# Patient Record
Sex: FEMALE | Race: WHITE | NOT HISPANIC OR LATINO | URBAN - METROPOLITAN AREA
[De-identification: names, ages, dates, MRNs, and addresses within clinical notes are randomized per-mention and may not be internally consistent; named-entity substitution may affect disease eponyms.]

---

## 2018-12-06 ENCOUNTER — INPATIENT (INPATIENT)
Facility: HOSPITAL | Age: 33
LOS: 42 days | Discharge: HOME | DRG: 831 | End: 2019-01-18
Attending: OBSTETRICS & GYNECOLOGY | Admitting: OBSTETRICS & GYNECOLOGY
Payer: COMMERCIAL

## 2018-12-06 VITALS — WEIGHT: 163.14 LBS | HEIGHT: 66 IN

## 2018-12-06 DIAGNOSIS — Z3A.00 WEEKS OF GESTATION OF PREGNANCY NOT SPECIFIED: ICD-10-CM

## 2018-12-06 DIAGNOSIS — O26.899 OTHER SPECIFIED PREGNANCY RELATED CONDITIONS, UNSPECIFIED TRIMESTER: ICD-10-CM

## 2018-12-06 LAB
ALBUMIN SERPL ELPH-MCNC: 3.5 G/DL — SIGNIFICANT CHANGE UP (ref 3.3–5)
ALP SERPL-CCNC: 67 U/L — SIGNIFICANT CHANGE UP (ref 40–120)
ALT FLD-CCNC: 11 U/L — SIGNIFICANT CHANGE UP (ref 10–45)
ANION GAP SERPL CALC-SCNC: 18 MMOL/L — HIGH (ref 5–17)
APPEARANCE UR: CLEAR — SIGNIFICANT CHANGE UP
APTT BLD: 26.1 SEC — LOW (ref 27.5–36.3)
AST SERPL-CCNC: 14 U/L — SIGNIFICANT CHANGE UP (ref 10–40)
BASOPHILS NFR BLD AUTO: 0.3 % — SIGNIFICANT CHANGE UP (ref 0–2)
BILIRUB SERPL-MCNC: 0.2 MG/DL — SIGNIFICANT CHANGE UP (ref 0.2–1.2)
BILIRUB UR-MCNC: NEGATIVE — SIGNIFICANT CHANGE UP
BLD GP AB SCN SERPL QL: NEGATIVE — SIGNIFICANT CHANGE UP
BUN SERPL-MCNC: 6 MG/DL — LOW (ref 7–23)
CALCIUM SERPL-MCNC: 8.9 MG/DL — SIGNIFICANT CHANGE UP (ref 8.4–10.5)
CHLORIDE SERPL-SCNC: 103 MMOL/L — SIGNIFICANT CHANGE UP (ref 96–108)
CO2 SERPL-SCNC: 19 MMOL/L — LOW (ref 22–31)
COLOR SPEC: YELLOW — SIGNIFICANT CHANGE UP
CREAT SERPL-MCNC: 0.54 MG/DL — SIGNIFICANT CHANGE UP (ref 0.5–1.3)
DIFF PNL FLD: NEGATIVE — SIGNIFICANT CHANGE UP
EOSINOPHIL NFR BLD AUTO: 1.2 % — SIGNIFICANT CHANGE UP (ref 0–6)
FIBRINOGEN PPP-MCNC: 410 MG/DL — SIGNIFICANT CHANGE UP (ref 258–438)
GLUCOSE SERPL-MCNC: 105 MG/DL — HIGH (ref 70–99)
GLUCOSE UR QL: NEGATIVE — SIGNIFICANT CHANGE UP
HCT VFR BLD CALC: 35.6 % — SIGNIFICANT CHANGE UP (ref 34.5–45)
HGB BLD-MCNC: 12.5 G/DL — SIGNIFICANT CHANGE UP (ref 11.5–15.5)
INR BLD: 0.97 — SIGNIFICANT CHANGE UP (ref 0.88–1.16)
KETONES UR-MCNC: 15 MG/DL
LDH SERPL L TO P-CCNC: 132 U/L — SIGNIFICANT CHANGE UP (ref 50–242)
LEUKOCYTE ESTERASE UR-ACNC: NEGATIVE — SIGNIFICANT CHANGE UP
LYMPHOCYTES # BLD AUTO: 14 % — SIGNIFICANT CHANGE UP (ref 13–44)
MCHC RBC-ENTMCNC: 30.8 PG — SIGNIFICANT CHANGE UP (ref 27–34)
MCHC RBC-ENTMCNC: 35.1 G/DL — SIGNIFICANT CHANGE UP (ref 32–36)
MCV RBC AUTO: 87.7 FL — SIGNIFICANT CHANGE UP (ref 80–100)
MONOCYTES NFR BLD AUTO: 7.2 % — SIGNIFICANT CHANGE UP (ref 2–14)
NEUTROPHILS NFR BLD AUTO: 77.3 % — HIGH (ref 43–77)
NITRITE UR-MCNC: NEGATIVE — SIGNIFICANT CHANGE UP
PH UR: 6.5 — SIGNIFICANT CHANGE UP (ref 5–8)
PLATELET # BLD AUTO: 260 K/UL — SIGNIFICANT CHANGE UP (ref 150–400)
POTASSIUM SERPL-MCNC: 3.7 MMOL/L — SIGNIFICANT CHANGE UP (ref 3.5–5.3)
POTASSIUM SERPL-SCNC: 3.7 MMOL/L — SIGNIFICANT CHANGE UP (ref 3.5–5.3)
PROT SERPL-MCNC: 6.4 G/DL — SIGNIFICANT CHANGE UP (ref 6–8.3)
PROT UR-MCNC: NEGATIVE MG/DL — SIGNIFICANT CHANGE UP
PROTHROM AB SERPL-ACNC: 10.9 SEC — SIGNIFICANT CHANGE UP (ref 10–12.9)
RBC # BLD: 4.06 M/UL — SIGNIFICANT CHANGE UP (ref 3.8–5.2)
RBC # FLD: 13.6 % — SIGNIFICANT CHANGE UP (ref 10.3–16.9)
RH IG SCN BLD-IMP: POSITIVE — SIGNIFICANT CHANGE UP
SODIUM SERPL-SCNC: 140 MMOL/L — SIGNIFICANT CHANGE UP (ref 135–145)
SP GR SPEC: <=1.005 — SIGNIFICANT CHANGE UP (ref 1–1.03)
URATE SERPL-MCNC: 3 MG/DL — SIGNIFICANT CHANGE UP (ref 2.5–7)
UROBILINOGEN FLD QL: 0.2 E.U./DL — SIGNIFICANT CHANGE UP
WBC # BLD: 10.6 K/UL — HIGH (ref 3.8–10.5)
WBC # FLD AUTO: 10.6 K/UL — HIGH (ref 3.8–10.5)

## 2018-12-06 RX ORDER — NIFEDIPINE 30 MG
20 TABLET, EXTENDED RELEASE 24 HR ORAL ONCE
Qty: 0 | Refills: 0 | Status: COMPLETED | OUTPATIENT
Start: 2018-12-06 | End: 2018-12-06

## 2018-12-06 RX ORDER — SODIUM CHLORIDE 9 MG/ML
1000 INJECTION, SOLUTION INTRAVENOUS
Qty: 0 | Refills: 0 | Status: DISCONTINUED | OUTPATIENT
Start: 2018-12-06 | End: 2018-12-16

## 2018-12-06 RX ORDER — SODIUM CHLORIDE 9 MG/ML
1000 INJECTION, SOLUTION INTRAVENOUS
Qty: 0 | Refills: 0 | Status: DISCONTINUED | OUTPATIENT
Start: 2018-12-06 | End: 2018-12-06

## 2018-12-06 RX ORDER — NIFEDIPINE 30 MG
10 TABLET, EXTENDED RELEASE 24 HR ORAL EVERY 6 HOURS
Qty: 0 | Refills: 0 | Status: DISCONTINUED | OUTPATIENT
Start: 2018-12-06 | End: 2018-12-07

## 2018-12-06 RX ORDER — PROGESTERONE 200 MG/1
200 CAPSULE, LIQUID FILLED ORAL AT BEDTIME
Qty: 0 | Refills: 0 | Status: DISCONTINUED | OUTPATIENT
Start: 2018-12-06 | End: 2019-01-18

## 2018-12-06 RX ADMIN — Medication 20 MILLIGRAM(S): at 17:54

## 2018-12-06 RX ADMIN — PROGESTERONE 200 MILLIGRAM(S): 200 CAPSULE, LIQUID FILLED ORAL at 22:19

## 2018-12-06 RX ADMIN — Medication 12 MILLIGRAM(S): at 17:00

## 2018-12-07 LAB — T PALLIDUM AB TITR SER: NEGATIVE — SIGNIFICANT CHANGE UP

## 2018-12-07 PROCEDURE — 99251: CPT

## 2018-12-07 RX ORDER — SODIUM CHLORIDE 9 MG/ML
3 INJECTION INTRAMUSCULAR; INTRAVENOUS; SUBCUTANEOUS EVERY 8 HOURS
Qty: 0 | Refills: 0 | Status: DISCONTINUED | OUTPATIENT
Start: 2018-12-07 | End: 2018-12-16

## 2018-12-07 RX ORDER — ENOXAPARIN SODIUM 100 MG/ML
40 INJECTION SUBCUTANEOUS EVERY 24 HOURS
Qty: 0 | Refills: 0 | Status: DISCONTINUED | OUTPATIENT
Start: 2018-12-07 | End: 2018-12-15

## 2018-12-07 RX ORDER — NIFEDIPINE 30 MG
10 TABLET, EXTENDED RELEASE 24 HR ORAL EVERY 6 HOURS
Qty: 0 | Refills: 0 | Status: COMPLETED | OUTPATIENT
Start: 2018-12-07 | End: 2018-12-10

## 2018-12-07 RX ORDER — HEPARIN SODIUM 5000 [USP'U]/ML
5000 INJECTION INTRAVENOUS; SUBCUTANEOUS EVERY 12 HOURS
Qty: 0 | Refills: 0 | Status: DISCONTINUED | OUTPATIENT
Start: 2018-12-07 | End: 2018-12-07

## 2018-12-07 RX ADMIN — Medication 10 MILLIGRAM(S): at 17:01

## 2018-12-07 RX ADMIN — ENOXAPARIN SODIUM 40 MILLIGRAM(S): 100 INJECTION SUBCUTANEOUS at 16:59

## 2018-12-07 RX ADMIN — SODIUM CHLORIDE 3 MILLILITER(S): 9 INJECTION INTRAMUSCULAR; INTRAVENOUS; SUBCUTANEOUS at 22:49

## 2018-12-07 RX ADMIN — SODIUM CHLORIDE 3 MILLILITER(S): 9 INJECTION INTRAMUSCULAR; INTRAVENOUS; SUBCUTANEOUS at 12:33

## 2018-12-07 RX ADMIN — Medication 10 MILLIGRAM(S): at 23:04

## 2018-12-07 RX ADMIN — Medication 10 MILLIGRAM(S): at 12:08

## 2018-12-07 RX ADMIN — Medication 10 MILLIGRAM(S): at 05:29

## 2018-12-07 RX ADMIN — PROGESTERONE 200 MILLIGRAM(S): 200 CAPSULE, LIQUID FILLED ORAL at 23:05

## 2018-12-07 RX ADMIN — Medication 12 MILLIGRAM(S): at 16:55

## 2018-12-08 LAB
C TRACH RRNA SPEC QL NAA+PROBE: SIGNIFICANT CHANGE UP
N GONORRHOEA RRNA SPEC QL NAA+PROBE: SIGNIFICANT CHANGE UP
SPECIMEN SOURCE: SIGNIFICANT CHANGE UP

## 2018-12-08 RX ADMIN — PROGESTERONE 200 MILLIGRAM(S): 200 CAPSULE, LIQUID FILLED ORAL at 21:26

## 2018-12-08 RX ADMIN — SODIUM CHLORIDE 3 MILLILITER(S): 9 INJECTION INTRAMUSCULAR; INTRAVENOUS; SUBCUTANEOUS at 12:00

## 2018-12-08 RX ADMIN — SODIUM CHLORIDE 3 MILLILITER(S): 9 INJECTION INTRAMUSCULAR; INTRAVENOUS; SUBCUTANEOUS at 06:00

## 2018-12-08 RX ADMIN — SODIUM CHLORIDE 3 MILLILITER(S): 9 INJECTION INTRAMUSCULAR; INTRAVENOUS; SUBCUTANEOUS at 21:26

## 2018-12-08 RX ADMIN — Medication 10 MILLIGRAM(S): at 12:08

## 2018-12-08 RX ADMIN — Medication 1 TABLET(S): at 18:08

## 2018-12-08 RX ADMIN — ENOXAPARIN SODIUM 40 MILLIGRAM(S): 100 INJECTION SUBCUTANEOUS at 16:03

## 2018-12-08 RX ADMIN — Medication 10 MILLIGRAM(S): at 05:53

## 2018-12-08 RX ADMIN — Medication 10 MILLIGRAM(S): at 18:07

## 2018-12-09 LAB
-  CLINDAMYCIN: SIGNIFICANT CHANGE UP
-  LEVOFLOXACIN: SIGNIFICANT CHANGE UP
-  PENICILLIN: SIGNIFICANT CHANGE UP
-  VANCOMYCIN: SIGNIFICANT CHANGE UP
CULTURE RESULTS: SIGNIFICANT CHANGE UP
METHOD TYPE: SIGNIFICANT CHANGE UP
ORGANISM # SPEC MICROSCOPIC CNT: SIGNIFICANT CHANGE UP
ORGANISM # SPEC MICROSCOPIC CNT: SIGNIFICANT CHANGE UP
SPECIMEN SOURCE: SIGNIFICANT CHANGE UP

## 2018-12-09 RX ORDER — PENICILLIN G POTASSIUM 5000000 [IU]/1
2.5 POWDER, FOR SOLUTION INTRAMUSCULAR; INTRAPLEURAL; INTRATHECAL; INTRAVENOUS EVERY 4 HOURS
Qty: 0 | Refills: 0 | Status: DISCONTINUED | OUTPATIENT
Start: 2018-12-10 | End: 2018-12-10

## 2018-12-09 RX ORDER — DOCUSATE SODIUM 100 MG
100 CAPSULE ORAL
Qty: 0 | Refills: 0 | Status: DISCONTINUED | OUTPATIENT
Start: 2018-12-09 | End: 2019-01-18

## 2018-12-09 RX ORDER — PENICILLIN G POTASSIUM 5000000 [IU]/1
POWDER, FOR SOLUTION INTRAMUSCULAR; INTRAPLEURAL; INTRATHECAL; INTRAVENOUS
Qty: 0 | Refills: 0 | Status: DISCONTINUED | OUTPATIENT
Start: 2018-12-09 | End: 2018-12-10

## 2018-12-09 RX ORDER — PENICILLIN G POTASSIUM 5000000 [IU]/1
5 POWDER, FOR SOLUTION INTRAMUSCULAR; INTRAPLEURAL; INTRATHECAL; INTRAVENOUS ONCE
Qty: 0 | Refills: 0 | Status: COMPLETED | OUTPATIENT
Start: 2018-12-09 | End: 2018-12-09

## 2018-12-09 RX ADMIN — Medication 1 TABLET(S): at 12:21

## 2018-12-09 RX ADMIN — Medication 10 MILLIGRAM(S): at 18:00

## 2018-12-09 RX ADMIN — PENICILLIN G POTASSIUM 100 MILLION UNIT(S): 5000000 POWDER, FOR SOLUTION INTRAMUSCULAR; INTRAPLEURAL; INTRATHECAL; INTRAVENOUS at 21:14

## 2018-12-09 RX ADMIN — PROGESTERONE 200 MILLIGRAM(S): 200 CAPSULE, LIQUID FILLED ORAL at 21:42

## 2018-12-09 RX ADMIN — Medication 10 MILLIGRAM(S): at 12:21

## 2018-12-09 RX ADMIN — Medication 10 MILLIGRAM(S): at 06:19

## 2018-12-09 RX ADMIN — SODIUM CHLORIDE 3 MILLILITER(S): 9 INJECTION INTRAMUSCULAR; INTRAVENOUS; SUBCUTANEOUS at 06:19

## 2018-12-09 RX ADMIN — SODIUM CHLORIDE 3 MILLILITER(S): 9 INJECTION INTRAMUSCULAR; INTRAVENOUS; SUBCUTANEOUS at 17:07

## 2018-12-09 RX ADMIN — Medication 100 MILLIGRAM(S): at 18:00

## 2018-12-09 RX ADMIN — ENOXAPARIN SODIUM 40 MILLIGRAM(S): 100 INJECTION SUBCUTANEOUS at 17:08

## 2018-12-09 RX ADMIN — SODIUM CHLORIDE 3 MILLILITER(S): 9 INJECTION INTRAMUSCULAR; INTRAVENOUS; SUBCUTANEOUS at 21:42

## 2018-12-10 LAB
T4 AB SER-ACNC: 8.79 UG/DL — SIGNIFICANT CHANGE UP (ref 3.17–11.72)
TSH SERPL-MCNC: 0.61 UIU/ML — SIGNIFICANT CHANGE UP (ref 0.35–4.94)

## 2018-12-10 RX ORDER — PENICILLIN G POTASSIUM 5000000 [IU]/1
2.5 POWDER, FOR SOLUTION INTRAMUSCULAR; INTRAPLEURAL; INTRATHECAL; INTRAVENOUS EVERY 6 HOURS
Qty: 0 | Refills: 0 | Status: DISCONTINUED | OUTPATIENT
Start: 2018-12-10 | End: 2018-12-13

## 2018-12-10 RX ORDER — PENICILLIN G POTASSIUM 5000000 [IU]/1
2.5 POWDER, FOR SOLUTION INTRAMUSCULAR; INTRAPLEURAL; INTRATHECAL; INTRAVENOUS EVERY 6 HOURS
Qty: 0 | Refills: 0 | Status: DISCONTINUED | OUTPATIENT
Start: 2018-12-10 | End: 2018-12-10

## 2018-12-10 RX ADMIN — SODIUM CHLORIDE 3 MILLILITER(S): 9 INJECTION INTRAMUSCULAR; INTRAVENOUS; SUBCUTANEOUS at 06:27

## 2018-12-10 RX ADMIN — Medication 10 MILLIGRAM(S): at 06:27

## 2018-12-10 RX ADMIN — Medication 100 MILLIGRAM(S): at 06:27

## 2018-12-10 RX ADMIN — PENICILLIN G POTASSIUM 100 MILLION UNIT(S): 5000000 POWDER, FOR SOLUTION INTRAMUSCULAR; INTRAPLEURAL; INTRATHECAL; INTRAVENOUS at 18:00

## 2018-12-10 RX ADMIN — SODIUM CHLORIDE 3 MILLILITER(S): 9 INJECTION INTRAMUSCULAR; INTRAVENOUS; SUBCUTANEOUS at 12:00

## 2018-12-10 RX ADMIN — Medication 10 MILLIGRAM(S): at 01:50

## 2018-12-10 RX ADMIN — PENICILLIN G POTASSIUM 100 MILLION UNIT(S): 5000000 POWDER, FOR SOLUTION INTRAMUSCULAR; INTRAPLEURAL; INTRATHECAL; INTRAVENOUS at 06:27

## 2018-12-10 RX ADMIN — Medication 100 MILLIGRAM(S): at 17:50

## 2018-12-10 RX ADMIN — Medication 1 TABLET(S): at 12:08

## 2018-12-10 RX ADMIN — ENOXAPARIN SODIUM 40 MILLIGRAM(S): 100 INJECTION SUBCUTANEOUS at 16:10

## 2018-12-10 RX ADMIN — PENICILLIN G POTASSIUM 100 MILLION UNIT(S): 5000000 POWDER, FOR SOLUTION INTRAMUSCULAR; INTRAPLEURAL; INTRATHECAL; INTRAVENOUS at 01:35

## 2018-12-10 RX ADMIN — Medication 10 MILLIGRAM(S): at 12:08

## 2018-12-10 RX ADMIN — PENICILLIN G POTASSIUM 100 MILLION UNIT(S): 5000000 POWDER, FOR SOLUTION INTRAMUSCULAR; INTRAPLEURAL; INTRATHECAL; INTRAVENOUS at 10:00

## 2018-12-11 RX ADMIN — PENICILLIN G POTASSIUM 100 MILLION UNIT(S): 5000000 POWDER, FOR SOLUTION INTRAMUSCULAR; INTRAPLEURAL; INTRATHECAL; INTRAVENOUS at 18:00

## 2018-12-11 RX ADMIN — PENICILLIN G POTASSIUM 100 MILLION UNIT(S): 5000000 POWDER, FOR SOLUTION INTRAMUSCULAR; INTRAPLEURAL; INTRATHECAL; INTRAVENOUS at 12:00

## 2018-12-11 RX ADMIN — SODIUM CHLORIDE 3 MILLILITER(S): 9 INJECTION INTRAMUSCULAR; INTRAVENOUS; SUBCUTANEOUS at 00:31

## 2018-12-11 RX ADMIN — SODIUM CHLORIDE 3 MILLILITER(S): 9 INJECTION INTRAMUSCULAR; INTRAVENOUS; SUBCUTANEOUS at 22:15

## 2018-12-11 RX ADMIN — SODIUM CHLORIDE 3 MILLILITER(S): 9 INJECTION INTRAMUSCULAR; INTRAVENOUS; SUBCUTANEOUS at 09:00

## 2018-12-11 RX ADMIN — PROGESTERONE 200 MILLIGRAM(S): 200 CAPSULE, LIQUID FILLED ORAL at 22:16

## 2018-12-11 RX ADMIN — SODIUM CHLORIDE 3 MILLILITER(S): 9 INJECTION INTRAMUSCULAR; INTRAVENOUS; SUBCUTANEOUS at 06:15

## 2018-12-11 RX ADMIN — PENICILLIN G POTASSIUM 100 MILLION UNIT(S): 5000000 POWDER, FOR SOLUTION INTRAMUSCULAR; INTRAPLEURAL; INTRATHECAL; INTRAVENOUS at 00:31

## 2018-12-11 RX ADMIN — PENICILLIN G POTASSIUM 100 MILLION UNIT(S): 5000000 POWDER, FOR SOLUTION INTRAMUSCULAR; INTRAPLEURAL; INTRATHECAL; INTRAVENOUS at 06:15

## 2018-12-11 RX ADMIN — Medication 1 TABLET(S): at 11:39

## 2018-12-11 RX ADMIN — Medication 100 MILLIGRAM(S): at 19:35

## 2018-12-11 RX ADMIN — ENOXAPARIN SODIUM 40 MILLIGRAM(S): 100 INJECTION SUBCUTANEOUS at 16:00

## 2018-12-11 NOTE — DIETITIAN INITIAL EVALUATION ADULT. - PHYSICAL APPEARANCE
pre-pregnancy weight:150lbs 115% of IBW.18lb weight gain in 24weeks and 5days gestation.Goal weight gain:34-45lbs during complete gestation./well nourished

## 2018-12-11 NOTE — DIETITIAN INITIAL EVALUATION ADULT. - OTHER INFO
34 y/o female 24weeks and 5days gestation with twins admitted with decreased CL of 6mm with funneling.Patient observed eating 80% of regular diet without any GI complaints.No pain and skin intact.Patient generally eats healthy diet with Prenatal vitamins.Pre-pregnancy weight 115% of IBW.

## 2018-12-11 NOTE — DIETITIAN INITIAL EVALUATION ADULT. - ENERGY NEEDS
Ht:5ft 6inches,IBW:130lbs +/-10%,115% of IBW.18lb weight gain since pregnancy.Increased kcal needs of 300kcal per twin(600+kcal) and 50gm additional protein per pre-pregnancy protein demands:105gmprotein and 35-40cc fluids:2380-2720cc fluids.

## 2018-12-12 LAB
BLD GP AB SCN SERPL QL: NEGATIVE — SIGNIFICANT CHANGE UP
RH IG SCN BLD-IMP: POSITIVE — SIGNIFICANT CHANGE UP

## 2018-12-12 RX ADMIN — SODIUM CHLORIDE 3 MILLILITER(S): 9 INJECTION INTRAMUSCULAR; INTRAVENOUS; SUBCUTANEOUS at 13:22

## 2018-12-12 RX ADMIN — Medication 1 TABLET(S): at 12:04

## 2018-12-12 RX ADMIN — PENICILLIN G POTASSIUM 100 MILLION UNIT(S): 5000000 POWDER, FOR SOLUTION INTRAMUSCULAR; INTRAPLEURAL; INTRATHECAL; INTRAVENOUS at 00:05

## 2018-12-12 RX ADMIN — SODIUM CHLORIDE 3 MILLILITER(S): 9 INJECTION INTRAMUSCULAR; INTRAVENOUS; SUBCUTANEOUS at 22:32

## 2018-12-12 RX ADMIN — PENICILLIN G POTASSIUM 100 MILLION UNIT(S): 5000000 POWDER, FOR SOLUTION INTRAMUSCULAR; INTRAPLEURAL; INTRATHECAL; INTRAVENOUS at 18:10

## 2018-12-12 RX ADMIN — ENOXAPARIN SODIUM 40 MILLIGRAM(S): 100 INJECTION SUBCUTANEOUS at 16:24

## 2018-12-12 RX ADMIN — PENICILLIN G POTASSIUM 100 MILLION UNIT(S): 5000000 POWDER, FOR SOLUTION INTRAMUSCULAR; INTRAPLEURAL; INTRATHECAL; INTRAVENOUS at 06:20

## 2018-12-12 RX ADMIN — PENICILLIN G POTASSIUM 100 MILLION UNIT(S): 5000000 POWDER, FOR SOLUTION INTRAMUSCULAR; INTRAPLEURAL; INTRATHECAL; INTRAVENOUS at 12:06

## 2018-12-12 RX ADMIN — SODIUM CHLORIDE 3 MILLILITER(S): 9 INJECTION INTRAMUSCULAR; INTRAVENOUS; SUBCUTANEOUS at 06:20

## 2018-12-12 RX ADMIN — Medication 100 MILLIGRAM(S): at 18:09

## 2018-12-12 RX ADMIN — Medication 100 MILLIGRAM(S): at 08:44

## 2018-12-12 RX ADMIN — PROGESTERONE 200 MILLIGRAM(S): 200 CAPSULE, LIQUID FILLED ORAL at 22:32

## 2018-12-13 RX ADMIN — PENICILLIN G POTASSIUM 100 MILLION UNIT(S): 5000000 POWDER, FOR SOLUTION INTRAMUSCULAR; INTRAPLEURAL; INTRATHECAL; INTRAVENOUS at 18:00

## 2018-12-13 RX ADMIN — PROGESTERONE 200 MILLIGRAM(S): 200 CAPSULE, LIQUID FILLED ORAL at 21:45

## 2018-12-13 RX ADMIN — PENICILLIN G POTASSIUM 100 MILLION UNIT(S): 5000000 POWDER, FOR SOLUTION INTRAMUSCULAR; INTRAPLEURAL; INTRATHECAL; INTRAVENOUS at 06:29

## 2018-12-13 RX ADMIN — PENICILLIN G POTASSIUM 100 MILLION UNIT(S): 5000000 POWDER, FOR SOLUTION INTRAMUSCULAR; INTRAPLEURAL; INTRATHECAL; INTRAVENOUS at 00:02

## 2018-12-13 RX ADMIN — SODIUM CHLORIDE 3 MILLILITER(S): 9 INJECTION INTRAMUSCULAR; INTRAVENOUS; SUBCUTANEOUS at 06:30

## 2018-12-13 RX ADMIN — SODIUM CHLORIDE 3 MILLILITER(S): 9 INJECTION INTRAMUSCULAR; INTRAVENOUS; SUBCUTANEOUS at 21:45

## 2018-12-13 RX ADMIN — SODIUM CHLORIDE 3 MILLILITER(S): 9 INJECTION INTRAMUSCULAR; INTRAVENOUS; SUBCUTANEOUS at 12:00

## 2018-12-13 RX ADMIN — ENOXAPARIN SODIUM 40 MILLIGRAM(S): 100 INJECTION SUBCUTANEOUS at 16:00

## 2018-12-13 RX ADMIN — Medication 1 TABLET(S): at 12:08

## 2018-12-13 RX ADMIN — PENICILLIN G POTASSIUM 100 MILLION UNIT(S): 5000000 POWDER, FOR SOLUTION INTRAMUSCULAR; INTRAPLEURAL; INTRATHECAL; INTRAVENOUS at 12:00

## 2018-12-14 LAB
A VAGINAE DNA VAG QL NAA+PROBE: SIGNIFICANT CHANGE UP
BVAB2 DNA VAG QL NAA+PROBE: SIGNIFICANT CHANGE UP
C ALBICANS DNA VAG QL NAA+PROBE: NEGATIVE — SIGNIFICANT CHANGE UP
C GLABRATA DNA VAG QL NAA+PROBE: NEGATIVE — SIGNIFICANT CHANGE UP
M GENITALIUM DNA SPEC QL NAA+PROBE: NEGATIVE — SIGNIFICANT CHANGE UP
M HOMINIS DNA SPEC QL NAA+PROBE: NEGATIVE — SIGNIFICANT CHANGE UP
MEGA1 DNA VAG QL NAA+PROBE: SIGNIFICANT CHANGE UP
T VAGINALIS RRNA SPEC QL NAA+PROBE: NEGATIVE — SIGNIFICANT CHANGE UP
UREAPLASMA DNA SPEC QL NAA+PROBE: NEGATIVE — SIGNIFICANT CHANGE UP
VAGINITIS PANEL COMMENT: SIGNIFICANT CHANGE UP

## 2018-12-14 RX ADMIN — SODIUM CHLORIDE 3 MILLILITER(S): 9 INJECTION INTRAMUSCULAR; INTRAVENOUS; SUBCUTANEOUS at 06:47

## 2018-12-14 RX ADMIN — ENOXAPARIN SODIUM 40 MILLIGRAM(S): 100 INJECTION SUBCUTANEOUS at 15:59

## 2018-12-14 RX ADMIN — SODIUM CHLORIDE 3 MILLILITER(S): 9 INJECTION INTRAMUSCULAR; INTRAVENOUS; SUBCUTANEOUS at 22:01

## 2018-12-14 RX ADMIN — PROGESTERONE 200 MILLIGRAM(S): 200 CAPSULE, LIQUID FILLED ORAL at 22:00

## 2018-12-14 RX ADMIN — SODIUM CHLORIDE 3 MILLILITER(S): 9 INJECTION INTRAMUSCULAR; INTRAVENOUS; SUBCUTANEOUS at 13:23

## 2018-12-14 RX ADMIN — Medication 100 MILLIGRAM(S): at 18:05

## 2018-12-14 RX ADMIN — Medication 1 TABLET(S): at 12:06

## 2018-12-15 LAB
BLD GP AB SCN SERPL QL: NEGATIVE — SIGNIFICANT CHANGE UP
HCT VFR BLD CALC: 34.6 % — SIGNIFICANT CHANGE UP (ref 34.5–45)
HGB BLD-MCNC: 12 G/DL — SIGNIFICANT CHANGE UP (ref 11.5–15.5)
MCHC RBC-ENTMCNC: 31.1 PG — SIGNIFICANT CHANGE UP (ref 27–34)
MCHC RBC-ENTMCNC: 34.7 G/DL — SIGNIFICANT CHANGE UP (ref 32–36)
MCV RBC AUTO: 89.6 FL — SIGNIFICANT CHANGE UP (ref 80–100)
PLATELET # BLD AUTO: 237 K/UL — SIGNIFICANT CHANGE UP (ref 150–400)
RBC # BLD: 3.86 M/UL — SIGNIFICANT CHANGE UP (ref 3.8–5.2)
RBC # FLD: 13.9 % — SIGNIFICANT CHANGE UP (ref 10.3–16.9)
RH IG SCN BLD-IMP: POSITIVE — SIGNIFICANT CHANGE UP
WBC # BLD: 10.1 K/UL — SIGNIFICANT CHANGE UP (ref 3.8–10.5)
WBC # FLD AUTO: 10.1 K/UL — SIGNIFICANT CHANGE UP (ref 3.8–10.5)

## 2018-12-15 RX ORDER — BACITRACIN ZINC 500 UNIT/G
1 OINTMENT IN PACKET (EA) TOPICAL ONCE
Qty: 0 | Refills: 0 | Status: COMPLETED | OUTPATIENT
Start: 2018-12-15 | End: 2018-12-15

## 2018-12-15 RX ORDER — ENOXAPARIN SODIUM 100 MG/ML
40 INJECTION SUBCUTANEOUS DAILY
Qty: 0 | Refills: 0 | Status: DISCONTINUED | OUTPATIENT
Start: 2018-12-15 | End: 2019-01-18

## 2018-12-15 RX ADMIN — PROGESTERONE 200 MILLIGRAM(S): 200 CAPSULE, LIQUID FILLED ORAL at 23:53

## 2018-12-15 RX ADMIN — ENOXAPARIN SODIUM 40 MILLIGRAM(S): 100 INJECTION SUBCUTANEOUS at 16:11

## 2018-12-15 RX ADMIN — Medication 1 TABLET(S): at 11:56

## 2018-12-15 RX ADMIN — Medication 1 APPLICATION(S): at 16:15

## 2018-12-15 RX ADMIN — Medication 100 MILLIGRAM(S): at 06:19

## 2018-12-16 RX ADMIN — Medication 1 APPLICATION(S): at 18:10

## 2018-12-16 RX ADMIN — Medication 100 MILLIGRAM(S): at 18:10

## 2018-12-16 RX ADMIN — PROGESTERONE 200 MILLIGRAM(S): 200 CAPSULE, LIQUID FILLED ORAL at 21:57

## 2018-12-16 RX ADMIN — Medication 1 TABLET(S): at 21:55

## 2018-12-16 RX ADMIN — ENOXAPARIN SODIUM 40 MILLIGRAM(S): 100 INJECTION SUBCUTANEOUS at 16:17

## 2018-12-16 RX ADMIN — Medication 1 APPLICATION(S): at 06:27

## 2018-12-16 RX ADMIN — Medication 100 MILLIGRAM(S): at 06:29

## 2018-12-17 RX ADMIN — Medication 100 MILLIGRAM(S): at 18:08

## 2018-12-17 RX ADMIN — Medication 1 TABLET(S): at 12:12

## 2018-12-17 RX ADMIN — Medication 1 APPLICATION(S): at 18:00

## 2018-12-17 RX ADMIN — PROGESTERONE 200 MILLIGRAM(S): 200 CAPSULE, LIQUID FILLED ORAL at 21:22

## 2018-12-17 RX ADMIN — SODIUM CHLORIDE 3 MILLILITER(S): 9 INJECTION INTRAMUSCULAR; INTRAVENOUS; SUBCUTANEOUS at 21:21

## 2018-12-17 RX ADMIN — Medication 1 APPLICATION(S): at 06:39

## 2018-12-17 RX ADMIN — ENOXAPARIN SODIUM 40 MILLIGRAM(S): 100 INJECTION SUBCUTANEOUS at 16:00

## 2018-12-17 RX ADMIN — Medication 100 MILLIGRAM(S): at 06:38

## 2018-12-18 DIAGNOSIS — F43.22 ADJUSTMENT DISORDER WITH ANXIETY: ICD-10-CM

## 2018-12-18 PROCEDURE — 99223 1ST HOSP IP/OBS HIGH 75: CPT

## 2018-12-18 RX ADMIN — Medication 1 APPLICATION(S): at 18:25

## 2018-12-18 RX ADMIN — Medication 1 APPLICATION(S): at 07:07

## 2018-12-18 RX ADMIN — ENOXAPARIN SODIUM 40 MILLIGRAM(S): 100 INJECTION SUBCUTANEOUS at 16:01

## 2018-12-18 RX ADMIN — PROGESTERONE 200 MILLIGRAM(S): 200 CAPSULE, LIQUID FILLED ORAL at 22:55

## 2018-12-18 RX ADMIN — Medication 100 MILLIGRAM(S): at 07:07

## 2018-12-18 RX ADMIN — Medication 1 TABLET(S): at 12:37

## 2018-12-18 NOTE — BEHAVIORAL HEALTH ASSESSMENT NOTE - SUMMARY
Ms. Snyder is a 33 year old White female, , residing in apartment in [place] with her  and 2.5 year old daughter, pregnant with twins,  at 25 weeks, history of social anxiety/SOLITARIO, no past psychiatric hospitalizations, who Pt is presenting with tearful with anxious mood and congruent affect. Patient disclosed feeling sad and worried about the prognosis of her twins, having difficulty coping with being alone in the hospital, and feeling guilty about being away from her daughter. Patient is hesitant to engage in talk therapy but is open to gaining positive coping skills/relaxation training to help her stay optimistic and better manage distress. Writer provided supportive psychotherapy. There is no evidence of SI/danger to self.

## 2018-12-18 NOTE — BEHAVIORAL HEALTH ASSESSMENT NOTE - NSBHADMITCOUNSEL_PSY_A_CORE
risk factor reduction/prognosis/instructions for management, treatment and follow up/risks and benefits of treatment options/client/family/caregiver education/diagnostic results/impressions and/or recommended studies/importance of adherence to chosen treatment

## 2018-12-18 NOTE — CHART NOTE - NSCHARTNOTEFT_GEN_A_CORE
Admitting Diagnosis:   Patient is a 33y old  Female who presents with a chief complaint of     PAST MEDICAL & SURGICAL HISTORY:      Current Nutrition Order:regular       PO Intake: Good (%) [ x  ]  Fair (50-75%) [   ] Poor (<25%) [   ]    GI Issues: none    Pain:none    Skin Integrity:intact    Labs:         CAPILLARY BLOOD GLUCOSE          Medications:  MEDICATIONS  (STANDING):  clotrimazole 1% Cream 1 Application(s) Topical two times a day  docusate sodium 100 milliGRAM(s) Oral two times a day  enoxaparin Injectable 40 milliGRAM(s) SubCutaneous daily  prenatal multivitamin 1 Tablet(s) Oral daily  progesterone Vaginal Insert 200 milliGRAM(s) Vaginal at bedtime    MEDICATIONS  (PRN):      Weight:76.2kg  Daily     Daily no updated weights    Weight Change: no updated weights    Nutrition Focused Physical Exam: Completed [   ]  Not Pertinent [ x  ]  Muscle Wasting- Temporal [   ]  Clavicle/Pectoral [   ]  Shoulder/Deltoid [   ]  Scapula [   ]  Interosseous [   ]  Quadriceps [   ]  Gastrocnemius [   ]  Fat Wasting- Orbital [   ]  Buccal [   ]  Triceps [   ]  Rib [   ]  Suspect [PCM] 2/2 to physical assessment, [poor intake], and [wt loss]; please see malnutrition chart note.    Estimated energy needs: Based on pre-pregnancy weight:150lbs(between %) 68gmprotein [id number](plus 600kcal for twins demands)2300-2640kcal and 50gmprotein additional:118gmprotein and 35-40cc fluids:2380-2720cc fluids    Subjective: 32y/o female 26weeks gestation with twins admitted with short cervix.Eating adequate amounts without issues.    Previous Nutrition Diagnosis:increased nutrient needs r/t increased demand for kcal/protein AEB:2nd trimester and twin demands    Active [   x]  Resolved [   ]    If resolved, new PES:     Goal:meet 80% of needs consistently    Recommendations:1.Updated weights    Education: completed    Risk Level: High [   ] Moderate [  x ] Low [   ]

## 2018-12-18 NOTE — BEHAVIORAL HEALTH ASSESSMENT NOTE - NSBHSUICPROTECTFACT_PSY_A_CORE
Supportive social network or family/Fear of death or dying due to pain/suffering/Ability to cope with stress/Identifies reasons for living/Engaged in work or school/Positive therapeutic relationships/Future oriented/Responsibility to family and others/High frustration tolerance

## 2018-12-19 LAB
APPEARANCE UR: CLEAR — SIGNIFICANT CHANGE UP
BILIRUB UR-MCNC: NEGATIVE — SIGNIFICANT CHANGE UP
COLOR SPEC: YELLOW — SIGNIFICANT CHANGE UP
DIFF PNL FLD: NEGATIVE — SIGNIFICANT CHANGE UP
GLUCOSE UR QL: NEGATIVE — SIGNIFICANT CHANGE UP
KETONES UR-MCNC: NEGATIVE — SIGNIFICANT CHANGE UP
LEUKOCYTE ESTERASE UR-ACNC: NEGATIVE — SIGNIFICANT CHANGE UP
NITRITE UR-MCNC: NEGATIVE — SIGNIFICANT CHANGE UP
PH UR: 6.5 — SIGNIFICANT CHANGE UP (ref 5–8)
PROT UR-MCNC: NEGATIVE MG/DL — SIGNIFICANT CHANGE UP
SP GR SPEC: 1.01 — SIGNIFICANT CHANGE UP (ref 1–1.03)
UROBILINOGEN FLD QL: 0.2 E.U./DL — SIGNIFICANT CHANGE UP

## 2018-12-19 RX ADMIN — Medication 100 MILLIGRAM(S): at 07:49

## 2018-12-19 RX ADMIN — Medication 100 MILLIGRAM(S): at 18:25

## 2018-12-19 RX ADMIN — ENOXAPARIN SODIUM 40 MILLIGRAM(S): 100 INJECTION SUBCUTANEOUS at 17:35

## 2018-12-19 RX ADMIN — Medication 1 TABLET(S): at 13:19

## 2018-12-19 RX ADMIN — Medication 1 APPLICATION(S): at 07:10

## 2018-12-20 LAB
CULTURE RESULTS: SIGNIFICANT CHANGE UP
SPECIMEN SOURCE: SIGNIFICANT CHANGE UP

## 2018-12-20 RX ADMIN — PROGESTERONE 200 MILLIGRAM(S): 200 CAPSULE, LIQUID FILLED ORAL at 23:28

## 2018-12-20 RX ADMIN — Medication 1 TABLET(S): at 12:29

## 2018-12-20 RX ADMIN — Medication 1 APPLICATION(S): at 12:00

## 2018-12-20 RX ADMIN — ENOXAPARIN SODIUM 40 MILLIGRAM(S): 100 INJECTION SUBCUTANEOUS at 16:30

## 2018-12-21 RX ADMIN — Medication 100 MILLIGRAM(S): at 18:29

## 2018-12-21 RX ADMIN — ENOXAPARIN SODIUM 40 MILLIGRAM(S): 100 INJECTION SUBCUTANEOUS at 16:24

## 2018-12-21 RX ADMIN — Medication 1 TABLET(S): at 19:50

## 2018-12-22 RX ADMIN — Medication 1 TABLET(S): at 12:09

## 2018-12-22 RX ADMIN — Medication 100 MILLIGRAM(S): at 06:19

## 2018-12-22 RX ADMIN — ENOXAPARIN SODIUM 40 MILLIGRAM(S): 100 INJECTION SUBCUTANEOUS at 16:26

## 2018-12-22 RX ADMIN — Medication 1 APPLICATION(S): at 18:12

## 2018-12-22 RX ADMIN — Medication 100 MILLIGRAM(S): at 18:09

## 2018-12-22 RX ADMIN — PROGESTERONE 200 MILLIGRAM(S): 200 CAPSULE, LIQUID FILLED ORAL at 22:24

## 2018-12-23 RX ADMIN — Medication 1 APPLICATION(S): at 18:00

## 2018-12-23 RX ADMIN — Medication 1 APPLICATION(S): at 06:22

## 2018-12-23 RX ADMIN — Medication 100 MILLIGRAM(S): at 07:22

## 2018-12-23 RX ADMIN — Medication 100 MILLIGRAM(S): at 18:40

## 2018-12-23 RX ADMIN — Medication 1 TABLET(S): at 21:23

## 2018-12-23 RX ADMIN — ENOXAPARIN SODIUM 40 MILLIGRAM(S): 100 INJECTION SUBCUTANEOUS at 16:01

## 2018-12-24 LAB
BLD GP AB SCN SERPL QL: NEGATIVE — SIGNIFICANT CHANGE UP
GLUCOSE SERPL-MCNC: 136 MG/DL — HIGH (ref 70–99)
RH IG SCN BLD-IMP: POSITIVE — SIGNIFICANT CHANGE UP

## 2018-12-24 RX ORDER — DEXTROSE 50 % IN WATER 50 %
50 SYRINGE (ML) INTRAVENOUS ONCE
Qty: 0 | Refills: 0 | Status: COMPLETED | OUTPATIENT
Start: 2018-12-24 | End: 2018-12-24

## 2018-12-24 RX ADMIN — ENOXAPARIN SODIUM 40 MILLIGRAM(S): 100 INJECTION SUBCUTANEOUS at 16:22

## 2018-12-24 RX ADMIN — Medication 1 TABLET(S): at 21:57

## 2018-12-24 RX ADMIN — Medication 100 MILLIGRAM(S): at 07:46

## 2018-12-24 RX ADMIN — Medication 1 APPLICATION(S): at 18:48

## 2018-12-24 RX ADMIN — Medication 50 GRAM(S): at 10:45

## 2018-12-24 RX ADMIN — Medication 100 MILLIGRAM(S): at 18:49

## 2018-12-25 RX ORDER — DEXTROSE 50 % IN WATER 50 %
25 SYRINGE (ML) INTRAVENOUS ONCE
Qty: 0 | Refills: 0 | Status: DISCONTINUED | OUTPATIENT
Start: 2018-12-25 | End: 2019-01-06

## 2018-12-25 RX ORDER — GLUCAGON INJECTION, SOLUTION 0.5 MG/.1ML
1 INJECTION, SOLUTION SUBCUTANEOUS ONCE
Qty: 0 | Refills: 0 | Status: DISCONTINUED | OUTPATIENT
Start: 2018-12-25 | End: 2019-01-06

## 2018-12-25 RX ORDER — DEXTROSE 50 % IN WATER 50 %
15 SYRINGE (ML) INTRAVENOUS ONCE
Qty: 0 | Refills: 0 | Status: DISCONTINUED | OUTPATIENT
Start: 2018-12-25 | End: 2019-01-06

## 2018-12-25 RX ORDER — DEXTROSE 50 % IN WATER 50 %
12.5 SYRINGE (ML) INTRAVENOUS ONCE
Qty: 0 | Refills: 0 | Status: DISCONTINUED | OUTPATIENT
Start: 2018-12-25 | End: 2019-01-06

## 2018-12-25 RX ORDER — INSULIN LISPRO 100/ML
VIAL (ML) SUBCUTANEOUS
Qty: 0 | Refills: 0 | Status: DISCONTINUED | OUTPATIENT
Start: 2018-12-25 | End: 2019-01-18

## 2018-12-25 RX ORDER — SODIUM CHLORIDE 9 MG/ML
1000 INJECTION, SOLUTION INTRAVENOUS
Qty: 0 | Refills: 0 | Status: DISCONTINUED | OUTPATIENT
Start: 2018-12-25 | End: 2019-01-06

## 2018-12-25 RX ADMIN — Medication 1 TABLET(S): at 21:20

## 2018-12-25 RX ADMIN — PROGESTERONE 200 MILLIGRAM(S): 200 CAPSULE, LIQUID FILLED ORAL at 22:49

## 2018-12-25 RX ADMIN — Medication 100 MILLIGRAM(S): at 07:16

## 2018-12-25 RX ADMIN — ENOXAPARIN SODIUM 40 MILLIGRAM(S): 100 INJECTION SUBCUTANEOUS at 16:11

## 2018-12-25 RX ADMIN — Medication 1 APPLICATION(S): at 18:59

## 2018-12-25 RX ADMIN — Medication 1 APPLICATION(S): at 07:16

## 2018-12-25 RX ADMIN — Medication 100 MILLIGRAM(S): at 18:59

## 2018-12-25 RX ADMIN — Medication 100 MILLIGRAM(S): at 19:12

## 2018-12-26 LAB
GLUCOSE BLDC GLUCOMTR-MCNC: 87 MG/DL — SIGNIFICANT CHANGE UP (ref 70–99)
GLUCOSE BLDC GLUCOMTR-MCNC: 90 MG/DL — SIGNIFICANT CHANGE UP (ref 70–99)
GLUCOSE BLDC GLUCOMTR-MCNC: 92 MG/DL — SIGNIFICANT CHANGE UP (ref 70–99)
GLUCOSE BLDC GLUCOMTR-MCNC: 95 MG/DL — SIGNIFICANT CHANGE UP (ref 70–99)

## 2018-12-26 PROCEDURE — 99232 SBSQ HOSP IP/OBS MODERATE 35: CPT

## 2018-12-26 RX ADMIN — Medication 1 APPLICATION(S): at 21:31

## 2018-12-26 RX ADMIN — ENOXAPARIN SODIUM 40 MILLIGRAM(S): 100 INJECTION SUBCUTANEOUS at 16:18

## 2018-12-26 RX ADMIN — Medication 1 TABLET(S): at 21:31

## 2018-12-26 RX ADMIN — PROGESTERONE 200 MILLIGRAM(S): 200 CAPSULE, LIQUID FILLED ORAL at 21:31

## 2018-12-26 RX ADMIN — Medication 100 MILLIGRAM(S): at 17:32

## 2018-12-26 RX ADMIN — Medication 100 MILLIGRAM(S): at 09:13

## 2018-12-26 NOTE — PROGRESS NOTE BEHAVIORAL HEALTH - NSBHFUPINTERVALHXFT_PSY_A_CORE
Pt states she is feeling better now that she is getting closer to 28 weeks. She feels calmer and states what helps her best is to distract herself by watching movies, working on projects. We spoke about pt's daughter who seems to be coping well with separation, which also has contributed to pt's well being. Pt states that she would prefer to let her team know if she wanted to speak to our team. She is appreciative of our visits but feels that she does not need to speak to psychiatry at this point.

## 2018-12-26 NOTE — PROGRESS NOTE BEHAVIORAL HEALTH - NSBHADMITCOUNSEL_PSY_A_CORE
diagnostic results/impressions and/or recommended studies/importance of adherence to chosen treatment/risks and benefits of treatment options/client/family/caregiver education/prognosis/instructions for management, treatment and follow up/risk factor reduction

## 2018-12-26 NOTE — PROGRESS NOTE BEHAVIORAL HEALTH - SUMMARY
Ms. Snyder is a 33 year old White female, , residing in apartment in [place] with her  and 2.5 year old daughter, pregnant with twins,  at 25 weeks, history of social anxiety/SOLITARIO, no past psychiatric hospitalizations. Currently pt endorses improved mood, denying acute anxiety or depressive symptoms. She feels fully supported by her family. Pt's affect and bright and reactive. She states she will let the primary team know if she wants to speak with psychiatry in the future.

## 2018-12-27 LAB
BLD GP AB SCN SERPL QL: NEGATIVE — SIGNIFICANT CHANGE UP
GLUCOSE BLDC GLUCOMTR-MCNC: 100 MG/DL — HIGH (ref 70–99)
GLUCOSE BLDC GLUCOMTR-MCNC: 100 MG/DL — HIGH (ref 70–99)
GLUCOSE BLDC GLUCOMTR-MCNC: 93 MG/DL — SIGNIFICANT CHANGE UP (ref 70–99)
GLUCOSE BLDC GLUCOMTR-MCNC: 95 MG/DL — SIGNIFICANT CHANGE UP (ref 70–99)
RH IG SCN BLD-IMP: POSITIVE — SIGNIFICANT CHANGE UP

## 2018-12-27 RX ADMIN — ENOXAPARIN SODIUM 40 MILLIGRAM(S): 100 INJECTION SUBCUTANEOUS at 15:45

## 2018-12-27 RX ADMIN — Medication 1 APPLICATION(S): at 17:50

## 2018-12-27 RX ADMIN — Medication 1 TABLET(S): at 21:05

## 2018-12-27 RX ADMIN — Medication 100 MILLIGRAM(S): at 07:21

## 2018-12-27 RX ADMIN — Medication 1 APPLICATION(S): at 07:20

## 2018-12-27 RX ADMIN — PROGESTERONE 200 MILLIGRAM(S): 200 CAPSULE, LIQUID FILLED ORAL at 21:05

## 2018-12-27 RX ADMIN — Medication 100 MILLIGRAM(S): at 17:51

## 2018-12-27 NOTE — CHART NOTE - NSCHARTNOTEFT_GEN_A_CORE
Admitting Diagnosis:   Patient is a 33y old  Female who presents with a chief complaint of     PAST MEDICAL & SURGICAL HISTORY:      Current Nutrition Order:   Diet, Regular (12-27-18 @ 14:08)      PO Intake: Good (%) [  x ]  Fair (50-75%) [   ] Poor (<25%) [   ]    GI Issues: none    Pain:none    Skin Integrity:intact    Labs:         CAPILLARY BLOOD GLUCOSE      POCT Blood Glucose.: 95 mg/dL (27 Dec 2018 10:50)  POCT Blood Glucose.: 93 mg/dL (27 Dec 2018 07:17)  POCT Blood Glucose.: 90 mg/dL (26 Dec 2018 20:40)  POCT Blood Glucose.: 95 mg/dL (26 Dec 2018 15:46)      Medications:  MEDICATIONS  (STANDING):  clotrimazole 1% Cream 1 Application(s) Topical two times a day  dextrose 5%. 1000 milliLiter(s) (50 mL/Hr) IV Continuous <Continuous>  dextrose 50% Injectable 12.5 Gram(s) IV Push once  dextrose 50% Injectable 25 Gram(s) IV Push once  dextrose 50% Injectable 25 Gram(s) IV Push once  docusate sodium 100 milliGRAM(s) Oral two times a day  enoxaparin Injectable 40 milliGRAM(s) SubCutaneous daily  insulin lispro (HumaLOG) corrective regimen sliding scale   SubCutaneous Before meals and at bedtime  prenatal multivitamin 1 Tablet(s) Oral daily  progesterone Vaginal Insert 200 milliGRAM(s) Vaginal at bedtime    MEDICATIONS  (PRN):  dextrose 40% Gel 15 Gram(s) Oral once PRN Blood Glucose LESS THAN 70 milliGRAM(s)/deciliter  glucagon  Injectable 1 milliGRAM(s) IntraMuscular once PRN Glucose LESS THAN 70 milligrams/deciliter      Weight:76.2kg  Daily     Daily no updated weights    Weight Change: no updated weights.Pre-pregnancy weight:68kg    Nutrition Focused Physical Exam: Completed [   ]  Not Pertinent [  x ]  Muscle Wasting- Temporal [   ]  Clavicle/Pectoral [   ]  Shoulder/Deltoid [   ]  Scapula [   ]  Interosseous [   ]  Quadriceps [   ]  Gastrocnemius [   ]  Fat Wasting- Orbital [   ]  Buccal [   ]  Triceps [   ]  Rib [   ]  Suspect [PCM] 2/2 to physical assessment, [poor intake], and [wt loss]; please see malnutrition chart note.    Estimated energy needs: Based on Pre-pregnancy weight:68kg [id number] (plus 600kcal for twins):2300-2640kcal and additional 50gmprotein:118gmprotien and 35-40cc fluids:2380-2720cc fluids    Subjective: 34 y/o female at 26 and 5/7 weeks with twins gestation admitted for short cervix.Eating adequate amounts.No complaints offered.Increased outside foods noted      Previous Nutrition Diagnosis:increased nutrient needs r/t increased demand for kcal/protein and nutrients AEB: 2nd trimester demands.    Active [ x  ]  Resolved [   ]    If resolved, new PES:     Goal:Meet 80% of needs consistently    Recommendations:1.Updated weights    Education: completed    Risk Level: High [   ] Moderate [ x  ] Low [   ]

## 2018-12-28 LAB
GLUCOSE BLDC GLUCOMTR-MCNC: 106 MG/DL — HIGH (ref 70–99)
GLUCOSE BLDC GLUCOMTR-MCNC: 91 MG/DL — SIGNIFICANT CHANGE UP (ref 70–99)
GLUCOSE BLDC GLUCOMTR-MCNC: 95 MG/DL — SIGNIFICANT CHANGE UP (ref 70–99)
GLUCOSE BLDC GLUCOMTR-MCNC: 96 MG/DL — SIGNIFICANT CHANGE UP (ref 70–99)

## 2018-12-28 RX ADMIN — Medication 1 APPLICATION(S): at 07:42

## 2018-12-28 RX ADMIN — Medication 100 MILLIGRAM(S): at 07:42

## 2018-12-28 RX ADMIN — ENOXAPARIN SODIUM 40 MILLIGRAM(S): 100 INJECTION SUBCUTANEOUS at 16:12

## 2018-12-28 RX ADMIN — PROGESTERONE 200 MILLIGRAM(S): 200 CAPSULE, LIQUID FILLED ORAL at 22:05

## 2018-12-28 RX ADMIN — Medication 1 TABLET(S): at 22:04

## 2018-12-28 RX ADMIN — Medication 100 MILLIGRAM(S): at 18:00

## 2018-12-29 LAB
APPEARANCE UR: CLEAR — SIGNIFICANT CHANGE UP
BILIRUB UR-MCNC: NEGATIVE — SIGNIFICANT CHANGE UP
COLOR SPEC: YELLOW — SIGNIFICANT CHANGE UP
DIFF PNL FLD: NEGATIVE — SIGNIFICANT CHANGE UP
GLUCOSE BLDC GLUCOMTR-MCNC: 93 MG/DL — SIGNIFICANT CHANGE UP (ref 70–99)
GLUCOSE UR QL: NEGATIVE — SIGNIFICANT CHANGE UP
KETONES UR-MCNC: 15 MG/DL
LEUKOCYTE ESTERASE UR-ACNC: NEGATIVE — SIGNIFICANT CHANGE UP
NITRITE UR-MCNC: NEGATIVE — SIGNIFICANT CHANGE UP
PH UR: 6 — SIGNIFICANT CHANGE UP (ref 5–8)
PROT UR-MCNC: NEGATIVE MG/DL — SIGNIFICANT CHANGE UP
SP GR SPEC: 1.02 — SIGNIFICANT CHANGE UP (ref 1–1.03)
UROBILINOGEN FLD QL: 0.2 E.U./DL — SIGNIFICANT CHANGE UP

## 2018-12-29 RX ADMIN — Medication 1 APPLICATION(S): at 07:44

## 2018-12-29 RX ADMIN — ENOXAPARIN SODIUM 40 MILLIGRAM(S): 100 INJECTION SUBCUTANEOUS at 16:00

## 2018-12-29 RX ADMIN — Medication 100 MILLIGRAM(S): at 07:44

## 2018-12-29 RX ADMIN — Medication 1 TABLET(S): at 22:08

## 2018-12-29 RX ADMIN — Medication 100 MILLIGRAM(S): at 18:10

## 2018-12-30 LAB — GLUCOSE BLDC GLUCOMTR-MCNC: 101 MG/DL — HIGH (ref 70–99)

## 2018-12-30 RX ADMIN — Medication 100 MILLIGRAM(S): at 06:53

## 2018-12-30 RX ADMIN — Medication 1 APPLICATION(S): at 18:08

## 2018-12-30 RX ADMIN — Medication 100 MILLIGRAM(S): at 18:07

## 2018-12-30 RX ADMIN — ENOXAPARIN SODIUM 40 MILLIGRAM(S): 100 INJECTION SUBCUTANEOUS at 16:02

## 2018-12-30 RX ADMIN — Medication 1 TABLET(S): at 21:53

## 2018-12-31 LAB — GLUCOSE BLDC GLUCOMTR-MCNC: 96 MG/DL — SIGNIFICANT CHANGE UP (ref 70–99)

## 2018-12-31 RX ADMIN — Medication 100 MILLIGRAM(S): at 16:03

## 2018-12-31 RX ADMIN — Medication 1 TABLET(S): at 21:14

## 2018-12-31 RX ADMIN — ENOXAPARIN SODIUM 40 MILLIGRAM(S): 100 INJECTION SUBCUTANEOUS at 16:00

## 2018-12-31 RX ADMIN — Medication 1 APPLICATION(S): at 06:09

## 2018-12-31 RX ADMIN — PROGESTERONE 200 MILLIGRAM(S): 200 CAPSULE, LIQUID FILLED ORAL at 22:12

## 2018-12-31 RX ADMIN — Medication 1 APPLICATION(S): at 16:00

## 2018-12-31 RX ADMIN — PROGESTERONE 200 MILLIGRAM(S): 200 CAPSULE, LIQUID FILLED ORAL at 06:10

## 2018-12-31 RX ADMIN — Medication 100 MILLIGRAM(S): at 06:10

## 2019-01-01 LAB — GLUCOSE BLDC GLUCOMTR-MCNC: 92 MG/DL — SIGNIFICANT CHANGE UP (ref 70–99)

## 2019-01-01 RX ADMIN — Medication 1 TABLET(S): at 20:27

## 2019-01-01 RX ADMIN — Medication 1 APPLICATION(S): at 16:30

## 2019-01-01 RX ADMIN — PROGESTERONE 200 MILLIGRAM(S): 200 CAPSULE, LIQUID FILLED ORAL at 20:19

## 2019-01-01 RX ADMIN — ENOXAPARIN SODIUM 40 MILLIGRAM(S): 100 INJECTION SUBCUTANEOUS at 16:30

## 2019-01-02 LAB
BLD GP AB SCN SERPL QL: NEGATIVE — SIGNIFICANT CHANGE UP
GLUCOSE BLDC GLUCOMTR-MCNC: 87 MG/DL — SIGNIFICANT CHANGE UP (ref 70–99)
RH IG SCN BLD-IMP: POSITIVE — SIGNIFICANT CHANGE UP

## 2019-01-02 RX ADMIN — Medication 100 MILLIGRAM(S): at 10:09

## 2019-01-02 RX ADMIN — Medication 1 TABLET(S): at 21:19

## 2019-01-02 RX ADMIN — Medication 100 MILLIGRAM(S): at 21:19

## 2019-01-02 RX ADMIN — PROGESTERONE 200 MILLIGRAM(S): 200 CAPSULE, LIQUID FILLED ORAL at 20:54

## 2019-01-02 RX ADMIN — ENOXAPARIN SODIUM 40 MILLIGRAM(S): 100 INJECTION SUBCUTANEOUS at 16:00

## 2019-01-03 LAB — GLUCOSE BLDC GLUCOMTR-MCNC: 98 MG/DL — SIGNIFICANT CHANGE UP (ref 70–99)

## 2019-01-03 RX ORDER — METFORMIN HYDROCHLORIDE 850 MG/1
500 TABLET ORAL DAILY
Qty: 0 | Refills: 0 | Status: DISCONTINUED | OUTPATIENT
Start: 2019-01-03 | End: 2019-01-03

## 2019-01-03 RX ORDER — METFORMIN HYDROCHLORIDE 850 MG/1
500 TABLET ORAL DAILY
Qty: 0 | Refills: 0 | Status: DISCONTINUED | OUTPATIENT
Start: 2019-01-03 | End: 2019-01-04

## 2019-01-03 RX ADMIN — Medication 100 MILLIGRAM(S): at 07:33

## 2019-01-03 RX ADMIN — Medication 1 TABLET(S): at 21:43

## 2019-01-03 RX ADMIN — Medication 100 MILLIGRAM(S): at 16:00

## 2019-01-03 RX ADMIN — METFORMIN HYDROCHLORIDE 500 MILLIGRAM(S): 850 TABLET ORAL at 21:43

## 2019-01-03 RX ADMIN — ENOXAPARIN SODIUM 40 MILLIGRAM(S): 100 INJECTION SUBCUTANEOUS at 16:00

## 2019-01-03 RX ADMIN — Medication 1 APPLICATION(S): at 07:32

## 2019-01-03 RX ADMIN — PROGESTERONE 200 MILLIGRAM(S): 200 CAPSULE, LIQUID FILLED ORAL at 21:43

## 2019-01-03 NOTE — CHART NOTE - NSCHARTNOTEFT_GEN_A_CORE
Admitting Diagnosis:   Patient is a 33y old  Female who presents with a chief complaint of     PAST MEDICAL & SURGICAL HISTORY:      Current Nutrition Order:Gestational DM diet       PO Intake: Good (%) [ x  ]  Fair (50-75%) [   ] Poor (<25%) [   ]    GI Issues: none    Pain:none    Skin Integrity:intact    Labs:         CAPILLARY BLOOD GLUCOSE      POCT Blood Glucose.: 98 mg/dL (03 Jan 2019 07:52)      Medications:  MEDICATIONS  (STANDING):  clotrimazole 1% Cream 1 Application(s) Topical two times a day  dextrose 5%. 1000 milliLiter(s) (50 mL/Hr) IV Continuous <Continuous>  dextrose 50% Injectable 12.5 Gram(s) IV Push once  dextrose 50% Injectable 25 Gram(s) IV Push once  dextrose 50% Injectable 25 Gram(s) IV Push once  docusate sodium 100 milliGRAM(s) Oral two times a day  enoxaparin Injectable 40 milliGRAM(s) SubCutaneous daily  insulin lispro (HumaLOG) corrective regimen sliding scale   SubCutaneous Before meals and at bedtime  prenatal multivitamin 1 Tablet(s) Oral daily  progesterone Vaginal Insert 200 milliGRAM(s) Vaginal at bedtime    MEDICATIONS  (PRN):  dextrose 40% Gel 15 Gram(s) Oral once PRN Blood Glucose LESS THAN 70 milliGRAM(s)/deciliter  glucagon  Injectable 1 milliGRAM(s) IntraMuscular once PRN Glucose LESS THAN 70 milligrams/deciliter      Weight:81.3kg 12/31/2018  Daily     Daily     Weight Change: admitted weight:75kg 13lb weight gain since December 6th    Nutrition Focused Physical Exam: Completed [   ]  Not Pertinent [ x  ]  Muscle Wasting- Temporal [   ]  Clavicle/Pectoral [   ]  Shoulder/Deltoid [   ]  Scapula [   ]  Interosseous [   ]  Quadriceps [   ]  Gastrocnemius [   ]  Fat Wasting- Orbital [   ]  Buccal [   ]  Triceps [   ]  Rib [   ]  Suspect [PCM] 2/2 to physical assessment, [poor intake], and [wt loss]; please see malnutrition chart note.    Estimated energy needs: Based on Pre-pregnancy weight:68kg [id number](plus 600kcal for twins):2300-2640kcal and additional 50gmprotein:118gmprotein and 35-40cc fluids:2380-2720cc fluids    Subjective: 34 y/o 28 weeks (3rd trimester)gestation with twins admitted with short cervix.As per patient, still with elevated FS(above90) 98 this am.No N/V/D or pain.In general following a strict CCHO diet with 3 snacks.    Previous Nutrition Diagnosis:Increased nutrient needs r/t increased demand for kcal/protein and nutrients AEB: 3rd trimester demands/with twins    Active [ x  ]  Resolved [   ]    If resolved, new PES:     Goal:Meet 80% of needs consistently    Recommendations:1.Continue with gestational DM diet and weekly weights    Education: completed    Risk Level: High [   ] Moderate [ x  ] Low [   ]

## 2019-01-04 LAB — GLUCOSE BLDC GLUCOMTR-MCNC: 96 MG/DL — SIGNIFICANT CHANGE UP (ref 70–99)

## 2019-01-04 RX ORDER — METFORMIN HYDROCHLORIDE 850 MG/1
500 TABLET ORAL AT BEDTIME
Qty: 0 | Refills: 0 | Status: DISCONTINUED | OUTPATIENT
Start: 2019-01-04 | End: 2019-01-18

## 2019-01-04 RX ADMIN — Medication 100 MILLIGRAM(S): at 07:08

## 2019-01-04 RX ADMIN — Medication 1 APPLICATION(S): at 07:08

## 2019-01-04 RX ADMIN — METFORMIN HYDROCHLORIDE 500 MILLIGRAM(S): 850 TABLET ORAL at 22:33

## 2019-01-04 RX ADMIN — Medication 100 MILLIGRAM(S): at 18:05

## 2019-01-04 RX ADMIN — Medication 1 TABLET(S): at 22:32

## 2019-01-04 RX ADMIN — ENOXAPARIN SODIUM 40 MILLIGRAM(S): 100 INJECTION SUBCUTANEOUS at 16:07

## 2019-01-04 RX ADMIN — Medication 1 APPLICATION(S): at 18:05

## 2019-01-05 LAB
BLD GP AB SCN SERPL QL: NEGATIVE — SIGNIFICANT CHANGE UP
GLUCOSE BLDC GLUCOMTR-MCNC: 95 MG/DL — SIGNIFICANT CHANGE UP (ref 70–99)
RH IG SCN BLD-IMP: POSITIVE — SIGNIFICANT CHANGE UP

## 2019-01-05 RX ADMIN — ENOXAPARIN SODIUM 40 MILLIGRAM(S): 100 INJECTION SUBCUTANEOUS at 15:54

## 2019-01-05 RX ADMIN — Medication 1 TABLET(S): at 22:14

## 2019-01-05 RX ADMIN — Medication 1 APPLICATION(S): at 10:05

## 2019-01-05 RX ADMIN — METFORMIN HYDROCHLORIDE 500 MILLIGRAM(S): 850 TABLET ORAL at 22:13

## 2019-01-05 RX ADMIN — Medication 100 MILLIGRAM(S): at 22:13

## 2019-01-05 RX ADMIN — Medication 100 MILLIGRAM(S): at 10:06

## 2019-01-06 LAB
CULTURE RESULTS: SIGNIFICANT CHANGE UP
GLUCOSE BLDC GLUCOMTR-MCNC: 94 MG/DL — SIGNIFICANT CHANGE UP (ref 70–99)
SPECIMEN SOURCE: SIGNIFICANT CHANGE UP

## 2019-01-06 RX ADMIN — METFORMIN HYDROCHLORIDE 500 MILLIGRAM(S): 850 TABLET ORAL at 21:37

## 2019-01-06 RX ADMIN — Medication 100 MILLIGRAM(S): at 18:34

## 2019-01-06 RX ADMIN — Medication 1 APPLICATION(S): at 18:34

## 2019-01-06 RX ADMIN — Medication 100 MILLIGRAM(S): at 08:21

## 2019-01-06 RX ADMIN — ENOXAPARIN SODIUM 40 MILLIGRAM(S): 100 INJECTION SUBCUTANEOUS at 16:11

## 2019-01-06 RX ADMIN — Medication 1 TABLET(S): at 21:31

## 2019-01-07 LAB
CULTURE RESULTS: SIGNIFICANT CHANGE UP
GLUCOSE BLDC GLUCOMTR-MCNC: 89 MG/DL — SIGNIFICANT CHANGE UP (ref 70–99)
SPECIMEN SOURCE: SIGNIFICANT CHANGE UP

## 2019-01-07 RX ADMIN — Medication 100 MILLIGRAM(S): at 18:26

## 2019-01-07 RX ADMIN — ENOXAPARIN SODIUM 40 MILLIGRAM(S): 100 INJECTION SUBCUTANEOUS at 16:00

## 2019-01-07 RX ADMIN — Medication 100 MILLIGRAM(S): at 07:51

## 2019-01-07 RX ADMIN — Medication 1 TABLET(S): at 21:31

## 2019-01-07 RX ADMIN — METFORMIN HYDROCHLORIDE 500 MILLIGRAM(S): 850 TABLET ORAL at 21:31

## 2019-01-07 RX ADMIN — PROGESTERONE 200 MILLIGRAM(S): 200 CAPSULE, LIQUID FILLED ORAL at 21:34

## 2019-01-07 RX ADMIN — Medication 1 APPLICATION(S): at 18:00

## 2019-01-08 LAB
BLD GP AB SCN SERPL QL: NEGATIVE — SIGNIFICANT CHANGE UP
GLUCOSE BLDC GLUCOMTR-MCNC: 91 MG/DL — SIGNIFICANT CHANGE UP (ref 70–99)
RH IG SCN BLD-IMP: POSITIVE — SIGNIFICANT CHANGE UP

## 2019-01-08 RX ADMIN — METFORMIN HYDROCHLORIDE 500 MILLIGRAM(S): 850 TABLET ORAL at 22:10

## 2019-01-08 RX ADMIN — Medication 1 TABLET(S): at 22:10

## 2019-01-08 RX ADMIN — Medication 1 APPLICATION(S): at 16:06

## 2019-01-08 RX ADMIN — Medication 100 MILLIGRAM(S): at 16:06

## 2019-01-08 RX ADMIN — ENOXAPARIN SODIUM 40 MILLIGRAM(S): 100 INJECTION SUBCUTANEOUS at 16:05

## 2019-01-09 LAB — GLUCOSE BLDC GLUCOMTR-MCNC: 91 MG/DL — SIGNIFICANT CHANGE UP (ref 70–99)

## 2019-01-09 RX ADMIN — ENOXAPARIN SODIUM 40 MILLIGRAM(S): 100 INJECTION SUBCUTANEOUS at 16:02

## 2019-01-09 RX ADMIN — PROGESTERONE 200 MILLIGRAM(S): 200 CAPSULE, LIQUID FILLED ORAL at 00:16

## 2019-01-09 RX ADMIN — PROGESTERONE 200 MILLIGRAM(S): 200 CAPSULE, LIQUID FILLED ORAL at 21:23

## 2019-01-09 RX ADMIN — METFORMIN HYDROCHLORIDE 500 MILLIGRAM(S): 850 TABLET ORAL at 21:23

## 2019-01-09 RX ADMIN — Medication 100 MILLIGRAM(S): at 18:09

## 2019-01-09 RX ADMIN — Medication 1 TABLET(S): at 21:23

## 2019-01-09 RX ADMIN — Medication 1 APPLICATION(S): at 18:09

## 2019-01-09 NOTE — CHART NOTE - NSCHARTNOTEFT_GEN_A_CORE
Admitting Diagnosis:   Patient is a 33y old  Female who presents with a chief complaint of     PAST MEDICAL & SURGICAL HISTORY:      Current Nutrition Order:[facility] gestational DM       PO Intake: Good (%) [ x  ]  Fair (50-75%) [   ] Poor (<25%) [   ]    GI Issues: none    Pain:none    Skin Integrity:intact    Labs:         CAPILLARY BLOOD GLUCOSE      POCT Blood Glucose.: 91 mg/dL (09 Jan 2019 07:32)      Medications:  MEDICATIONS  (STANDING):  clotrimazole 1% Cream 1 Application(s) Topical two times a day  docusate sodium 100 milliGRAM(s) Oral two times a day  enoxaparin Injectable 40 milliGRAM(s) SubCutaneous daily  insulin lispro (HumaLOG) corrective regimen sliding scale   SubCutaneous Before meals and at bedtime  metFORMIN Extended-Release 500 milliGRAM(s) Oral at bedtime  prenatal multivitamin 1 Tablet(s) Oral daily  progesterone Vaginal Insert 200 milliGRAM(s) Vaginal at bedtime    MEDICATIONS  (PRN):      Weight:75kg  Daily     Daily no updated weights    Weight Change: no updated weights.  Nutrition Focused Physical Exam: Completed [   ]  Not Pertinent [ x  ]  Muscle Wasting- Temporal [   ]  Clavicle/Pectoral [   ]  Shoulder/Deltoid [   ]  Scapula [   ]  Interosseous [   ]  Quadriceps [   ]  Gastrocnemius [   ]  Fat Wasting- Orbital [   ]  Buccal [   ]  Triceps [   ]  Rib [   ]  Suspect [PCM] 2/2 to physical assessment, [poor intake], and [wt loss]; please see malnutrition chart note.    Estimated energy needs: Based on pre=pregnancy weight:68gm [id number](plus 600kcal for twins):2300-2640kcal and 50gmprotein additional:118gmprotein and 35-40cc fluids:2380-2720cc fluids    Subjective: 33 t/o female @29weeks gestation admitted for short cervix with twins. Gestational Dm diet ordered due to FS above 90.Eating 100%.No N/V/D>patient well educated on prescribed diet      Previous Nutrition Diagnosis: Increased nutrient needs r/t increased demand for kcal/protein AEB: 3rd trimester with twins demands    Active [ x  ]  Resolved [   ]    If resolved, new PES:     Goal:Meet 80% of needs consistently    Recommendations:1.Updated weights    Education: completed    Risk Level: High [   ] Moderate [  x ] Low [   ]

## 2019-01-10 LAB — GLUCOSE BLDC GLUCOMTR-MCNC: 93 MG/DL — SIGNIFICANT CHANGE UP (ref 70–99)

## 2019-01-10 RX ADMIN — Medication 100 MILLIGRAM(S): at 07:37

## 2019-01-10 RX ADMIN — Medication 1 TABLET(S): at 21:30

## 2019-01-10 RX ADMIN — PROGESTERONE 200 MILLIGRAM(S): 200 CAPSULE, LIQUID FILLED ORAL at 21:30

## 2019-01-10 RX ADMIN — ENOXAPARIN SODIUM 40 MILLIGRAM(S): 100 INJECTION SUBCUTANEOUS at 16:16

## 2019-01-10 RX ADMIN — METFORMIN HYDROCHLORIDE 500 MILLIGRAM(S): 850 TABLET ORAL at 21:30

## 2019-01-10 RX ADMIN — Medication 100 MILLIGRAM(S): at 17:48

## 2019-01-10 RX ADMIN — Medication 1 APPLICATION(S): at 07:36

## 2019-01-11 LAB
BLD GP AB SCN SERPL QL: NEGATIVE — SIGNIFICANT CHANGE UP
GLUCOSE BLDC GLUCOMTR-MCNC: 89 MG/DL — SIGNIFICANT CHANGE UP (ref 70–99)
RH IG SCN BLD-IMP: POSITIVE — SIGNIFICANT CHANGE UP

## 2019-01-11 RX ADMIN — PROGESTERONE 200 MILLIGRAM(S): 200 CAPSULE, LIQUID FILLED ORAL at 22:31

## 2019-01-11 RX ADMIN — METFORMIN HYDROCHLORIDE 500 MILLIGRAM(S): 850 TABLET ORAL at 22:34

## 2019-01-11 RX ADMIN — Medication 100 MILLIGRAM(S): at 05:40

## 2019-01-11 RX ADMIN — Medication 1 APPLICATION(S): at 05:39

## 2019-01-11 RX ADMIN — Medication 1 TABLET(S): at 22:34

## 2019-01-12 LAB
CULTURE RESULTS: SIGNIFICANT CHANGE UP
GLUCOSE BLDC GLUCOMTR-MCNC: 87 MG/DL — SIGNIFICANT CHANGE UP (ref 70–99)
SPECIMEN SOURCE: SIGNIFICANT CHANGE UP

## 2019-01-12 RX ADMIN — METFORMIN HYDROCHLORIDE 500 MILLIGRAM(S): 850 TABLET ORAL at 21:37

## 2019-01-12 RX ADMIN — ENOXAPARIN SODIUM 40 MILLIGRAM(S): 100 INJECTION SUBCUTANEOUS at 16:22

## 2019-01-12 RX ADMIN — Medication 1 TABLET(S): at 21:37

## 2019-01-12 RX ADMIN — Medication 100 MILLIGRAM(S): at 17:55

## 2019-01-12 RX ADMIN — Medication 1 APPLICATION(S): at 07:35

## 2019-01-12 RX ADMIN — Medication 1 APPLICATION(S): at 17:54

## 2019-01-13 LAB — GLUCOSE BLDC GLUCOMTR-MCNC: 89 MG/DL — SIGNIFICANT CHANGE UP (ref 70–99)

## 2019-01-13 RX ADMIN — ENOXAPARIN SODIUM 40 MILLIGRAM(S): 100 INJECTION SUBCUTANEOUS at 15:54

## 2019-01-13 RX ADMIN — Medication 1 TABLET(S): at 21:30

## 2019-01-13 RX ADMIN — Medication 1 APPLICATION(S): at 18:00

## 2019-01-13 RX ADMIN — METFORMIN HYDROCHLORIDE 500 MILLIGRAM(S): 850 TABLET ORAL at 21:29

## 2019-01-13 RX ADMIN — Medication 100 MILLIGRAM(S): at 19:25

## 2019-01-13 RX ADMIN — PROGESTERONE 200 MILLIGRAM(S): 200 CAPSULE, LIQUID FILLED ORAL at 21:30

## 2019-01-13 RX ADMIN — Medication 100 MILLIGRAM(S): at 07:23

## 2019-01-14 LAB
CULTURE RESULTS: SIGNIFICANT CHANGE UP
GLUCOSE BLDC GLUCOMTR-MCNC: 91 MG/DL — SIGNIFICANT CHANGE UP (ref 70–99)
SPECIMEN SOURCE: SIGNIFICANT CHANGE UP

## 2019-01-14 RX ADMIN — ENOXAPARIN SODIUM 40 MILLIGRAM(S): 100 INJECTION SUBCUTANEOUS at 16:11

## 2019-01-14 RX ADMIN — Medication 1 TABLET(S): at 21:25

## 2019-01-14 RX ADMIN — METFORMIN HYDROCHLORIDE 500 MILLIGRAM(S): 850 TABLET ORAL at 22:40

## 2019-01-15 LAB
APPEARANCE UR: CLEAR — SIGNIFICANT CHANGE UP
BILIRUB UR-MCNC: NEGATIVE — SIGNIFICANT CHANGE UP
COLOR SPEC: YELLOW — SIGNIFICANT CHANGE UP
DIFF PNL FLD: NEGATIVE — SIGNIFICANT CHANGE UP
GLUCOSE BLDC GLUCOMTR-MCNC: 84 MG/DL — SIGNIFICANT CHANGE UP (ref 70–99)
GLUCOSE UR QL: NEGATIVE — SIGNIFICANT CHANGE UP
KETONES UR-MCNC: 15 MG/DL
LEUKOCYTE ESTERASE UR-ACNC: NEGATIVE — SIGNIFICANT CHANGE UP
NITRITE UR-MCNC: NEGATIVE — SIGNIFICANT CHANGE UP
PH UR: 6.5 — SIGNIFICANT CHANGE UP (ref 5–8)
PROT UR-MCNC: NEGATIVE MG/DL — SIGNIFICANT CHANGE UP
SP GR SPEC: 1.01 — SIGNIFICANT CHANGE UP (ref 1–1.03)
UROBILINOGEN FLD QL: 0.2 E.U./DL — SIGNIFICANT CHANGE UP

## 2019-01-15 RX ADMIN — ENOXAPARIN SODIUM 40 MILLIGRAM(S): 100 INJECTION SUBCUTANEOUS at 16:00

## 2019-01-15 RX ADMIN — Medication 100 MILLIGRAM(S): at 19:13

## 2019-01-15 RX ADMIN — METFORMIN HYDROCHLORIDE 500 MILLIGRAM(S): 850 TABLET ORAL at 22:02

## 2019-01-15 RX ADMIN — Medication 1 TABLET(S): at 21:58

## 2019-01-15 RX ADMIN — Medication 100 MILLIGRAM(S): at 08:52

## 2019-01-16 LAB — GLUCOSE BLDC GLUCOMTR-MCNC: 88 MG/DL — SIGNIFICANT CHANGE UP (ref 70–99)

## 2019-01-16 RX ADMIN — PROGESTERONE 200 MILLIGRAM(S): 200 CAPSULE, LIQUID FILLED ORAL at 22:35

## 2019-01-16 RX ADMIN — METFORMIN HYDROCHLORIDE 500 MILLIGRAM(S): 850 TABLET ORAL at 22:34

## 2019-01-16 RX ADMIN — Medication 100 MILLIGRAM(S): at 22:35

## 2019-01-16 RX ADMIN — Medication 100 MILLIGRAM(S): at 16:59

## 2019-01-16 RX ADMIN — ENOXAPARIN SODIUM 40 MILLIGRAM(S): 100 INJECTION SUBCUTANEOUS at 16:00

## 2019-01-16 RX ADMIN — Medication 1 APPLICATION(S): at 17:00

## 2019-01-16 RX ADMIN — Medication 1 TABLET(S): at 22:34

## 2019-01-16 NOTE — CHART NOTE - NSCHARTNOTEFT_GEN_A_CORE
Admitting Diagnosis:   Patient is a 33y old  Female who presents with a chief complaint of     PAST MEDICAL & SURGICAL HISTORY:      Current Nutrition Order:Gestational Dm diet       PO Intake: Good (%) [ x  ]  Fair (50-75%) [   ] Poor (<25%) [   ]    GI Issues: none    Pain:  none  Skin Integrity:intact    Labs:         CAPILLARY BLOOD GLUCOSE      POCT Blood Glucose.: 88 mg/dL (16 Jan 2019 07:54)      Medications:  MEDICATIONS  (STANDING):  clotrimazole 1% Cream 1 Application(s) Topical two times a day  docusate sodium 100 milliGRAM(s) Oral two times a day  enoxaparin Injectable 40 milliGRAM(s) SubCutaneous daily  insulin lispro (HumaLOG) corrective regimen sliding scale   SubCutaneous Before meals and at bedtime  metFORMIN Extended-Release 500 milliGRAM(s) Oral at bedtime  prenatal multivitamin 1 Tablet(s) Oral daily  progesterone Vaginal Insert 200 milliGRAM(s) Vaginal at bedtime    MEDICATIONS  (PRN):      Weight:80.5kg  Daily     Daily     Weight Change: admitted weight:75kg 12.1 lb weigh gain since admission    Nutrition Focused Physical Exam: Completed [   ]  Not Pertinent [ x  ]  Muscle Wasting- Temporal [   ]  Clavicle/Pectoral [   ]  Shoulder/Deltoid [   ]  Scapula [   ]  Interosseous [   ]  Quadriceps [   ]  Gastrocnemius [   ]  Fat Wasting- Orbital [   ]  Buccal [   ]  Triceps [   ]  Rib [   ]  Suspect [PCM] 2/2 to physical assessment, [poor intake], and [wt loss]; please see malnutrition chart note.    Estimated energy needs: Based on pre-pregnancy weight 68kg d54-46pdai(plus 600kcal for twins) 2300-2640kcal and 50gmprotein additional:110gmprotein and 35-40cc fluids"9035=0877tc fluids    Subjective: 34 y/o female @30 weeks gestation with twins. admitted with short cervix As per patient, has lost 1 lb despite eating well balanced diet. Patient feels she may not be eating as much as before..Plans to incorporate more snacks    Previous Nutrition Diagnosis:Increased nutrient needs r/t increased demand for kcal/protein and nutrients AEB: 3rd trimester with twins demands  Active [ x  ]  Resolved [   ]    If resolved, new PES:     Goal:Meet 80%of needs with appropriate weight gain    Recommendations:1.Weekly weights 2.Add increased protein sources to diet    Education:   completed  Risk Level: High [   ] Moderate [ x  ] Low [   ]

## 2019-01-17 VITALS
DIASTOLIC BLOOD PRESSURE: 69 MMHG | HEART RATE: 85 BPM | RESPIRATION RATE: 18 BRPM | SYSTOLIC BLOOD PRESSURE: 97 MMHG | OXYGEN SATURATION: 100 % | TEMPERATURE: 97 F

## 2019-01-17 LAB
ALBUMIN SERPL ELPH-MCNC: 2.9 G/DL — LOW (ref 3.3–5)
ALP SERPL-CCNC: 103 U/L — SIGNIFICANT CHANGE UP (ref 40–120)
ALT FLD-CCNC: 15 U/L — SIGNIFICANT CHANGE UP (ref 10–45)
ANION GAP SERPL CALC-SCNC: 11 MMOL/L — SIGNIFICANT CHANGE UP (ref 5–17)
AST SERPL-CCNC: 10 U/L — SIGNIFICANT CHANGE UP (ref 10–40)
BILIRUB SERPL-MCNC: 0.3 MG/DL — SIGNIFICANT CHANGE UP (ref 0.2–1.2)
BLD GP AB SCN SERPL QL: NEGATIVE — SIGNIFICANT CHANGE UP
BUN SERPL-MCNC: 11 MG/DL — SIGNIFICANT CHANGE UP (ref 7–23)
CALCIUM SERPL-MCNC: 8.6 MG/DL — SIGNIFICANT CHANGE UP (ref 8.4–10.5)
CHLORIDE SERPL-SCNC: 105 MMOL/L — SIGNIFICANT CHANGE UP (ref 96–108)
CO2 SERPL-SCNC: 20 MMOL/L — LOW (ref 22–31)
CREAT SERPL-MCNC: 0.68 MG/DL — SIGNIFICANT CHANGE UP (ref 0.5–1.3)
GLUCOSE BLDC GLUCOMTR-MCNC: 90 MG/DL — SIGNIFICANT CHANGE UP (ref 70–99)
GLUCOSE SERPL-MCNC: 91 MG/DL — SIGNIFICANT CHANGE UP (ref 70–99)
HCT VFR BLD CALC: 35 % — SIGNIFICANT CHANGE UP (ref 34.5–45)
HGB BLD-MCNC: 11.9 G/DL — SIGNIFICANT CHANGE UP (ref 11.5–15.5)
MCHC RBC-ENTMCNC: 30.8 PG — SIGNIFICANT CHANGE UP (ref 27–34)
MCHC RBC-ENTMCNC: 34 G/DL — SIGNIFICANT CHANGE UP (ref 32–36)
MCV RBC AUTO: 90.7 FL — SIGNIFICANT CHANGE UP (ref 80–100)
PLATELET # BLD AUTO: 205 K/UL — SIGNIFICANT CHANGE UP (ref 150–400)
POTASSIUM SERPL-MCNC: 3.6 MMOL/L — SIGNIFICANT CHANGE UP (ref 3.5–5.3)
POTASSIUM SERPL-SCNC: 3.6 MMOL/L — SIGNIFICANT CHANGE UP (ref 3.5–5.3)
PROT SERPL-MCNC: 5.5 G/DL — LOW (ref 6–8.3)
RBC # BLD: 3.86 M/UL — SIGNIFICANT CHANGE UP (ref 3.8–5.2)
RBC # FLD: 14.3 % — SIGNIFICANT CHANGE UP (ref 10.3–16.9)
RH IG SCN BLD-IMP: POSITIVE — SIGNIFICANT CHANGE UP
SODIUM SERPL-SCNC: 136 MMOL/L — SIGNIFICANT CHANGE UP (ref 135–145)
WBC # BLD: 8.5 K/UL — SIGNIFICANT CHANGE UP (ref 3.8–10.5)
WBC # FLD AUTO: 8.5 K/UL — SIGNIFICANT CHANGE UP (ref 3.8–10.5)

## 2019-01-17 RX ADMIN — Medication 100 MILLIGRAM(S): at 18:39

## 2019-01-17 RX ADMIN — METFORMIN HYDROCHLORIDE 500 MILLIGRAM(S): 850 TABLET ORAL at 21:59

## 2019-01-17 RX ADMIN — Medication 1 APPLICATION(S): at 18:00

## 2019-01-17 RX ADMIN — ENOXAPARIN SODIUM 40 MILLIGRAM(S): 100 INJECTION SUBCUTANEOUS at 16:15

## 2019-01-17 RX ADMIN — Medication 1 APPLICATION(S): at 06:11

## 2019-01-18 LAB — GLUCOSE BLDC GLUCOMTR-MCNC: 93 MG/DL — SIGNIFICANT CHANGE UP (ref 70–99)

## 2019-01-18 PROCEDURE — 87591 N.GONORRHOEAE DNA AMP PROB: CPT

## 2019-01-18 PROCEDURE — 81003 URINALYSIS AUTO W/O SCOPE: CPT

## 2019-01-18 PROCEDURE — 87086 URINE CULTURE/COLONY COUNT: CPT

## 2019-01-18 PROCEDURE — 85025 COMPLETE CBC W/AUTO DIFF WBC: CPT

## 2019-01-18 PROCEDURE — 86901 BLOOD TYPING SEROLOGIC RH(D): CPT

## 2019-01-18 PROCEDURE — 87661 TRICHOMONAS VAGINALIS AMPLIF: CPT

## 2019-01-18 PROCEDURE — 85610 PROTHROMBIN TIME: CPT

## 2019-01-18 PROCEDURE — 87491 CHLMYD TRACH DNA AMP PROBE: CPT

## 2019-01-18 PROCEDURE — 85384 FIBRINOGEN ACTIVITY: CPT

## 2019-01-18 PROCEDURE — 85730 THROMBOPLASTIN TIME PARTIAL: CPT

## 2019-01-18 PROCEDURE — 36415 COLL VENOUS BLD VENIPUNCTURE: CPT

## 2019-01-18 PROCEDURE — 87801 DETECT AGNT MULT DNA AMPLI: CPT

## 2019-01-18 PROCEDURE — 84436 ASSAY OF TOTAL THYROXINE: CPT

## 2019-01-18 PROCEDURE — 86780 TREPONEMA PALLIDUM: CPT

## 2019-01-18 PROCEDURE — 83615 LACTATE (LD) (LDH) ENZYME: CPT

## 2019-01-18 PROCEDURE — 80053 COMPREHEN METABOLIC PANEL: CPT

## 2019-01-18 PROCEDURE — 87184 SC STD DISK METHOD PER PLATE: CPT

## 2019-01-18 PROCEDURE — 97110 THERAPEUTIC EXERCISES: CPT

## 2019-01-18 PROCEDURE — 82947 ASSAY GLUCOSE BLOOD QUANT: CPT

## 2019-01-18 PROCEDURE — 85027 COMPLETE CBC AUTOMATED: CPT

## 2019-01-18 PROCEDURE — 84443 ASSAY THYROID STIM HORMONE: CPT

## 2019-01-18 PROCEDURE — 87563 M. GENITALIUM AMP PROBE: CPT

## 2019-01-18 PROCEDURE — 86900 BLOOD TYPING SEROLOGIC ABO: CPT

## 2019-01-18 PROCEDURE — 86850 RBC ANTIBODY SCREEN: CPT

## 2019-01-18 PROCEDURE — 99214 OFFICE O/P EST MOD 30 MIN: CPT

## 2019-01-18 PROCEDURE — 84550 ASSAY OF BLOOD/URIC ACID: CPT

## 2019-01-18 PROCEDURE — 82962 GLUCOSE BLOOD TEST: CPT

## 2019-01-18 PROCEDURE — 87798 DETECT AGENT NOS DNA AMP: CPT

## 2019-01-18 RX ORDER — PROGESTERONE 200 MG/1
200 CAPSULE, LIQUID FILLED ORAL
Qty: 0 | Refills: 0 | COMMUNITY
Start: 2019-01-18

## 2019-01-18 RX ORDER — DOCUSATE SODIUM 100 MG
1 CAPSULE ORAL
Qty: 0 | Refills: 0 | COMMUNITY
Start: 2019-01-18

## 2019-01-18 RX ORDER — METFORMIN HYDROCHLORIDE 850 MG/1
1 TABLET ORAL
Qty: 0 | Refills: 0 | COMMUNITY
Start: 2019-01-18

## 2019-01-18 RX ADMIN — ENOXAPARIN SODIUM 40 MILLIGRAM(S): 100 INJECTION SUBCUTANEOUS at 16:07

## 2019-01-18 NOTE — DISCHARGE NOTE ANTEPARTUM - CARE PLAN
Principal Discharge DX:	Short cervix affecting pregnancy  Goal:	term delivery  Assessment and plan of treatment:	- continue vaginal progesterone   - continue kick counts for fetal status  - f/u with Dr. Wiley in the office in 1 week  Secondary Diagnosis:	Adjustment disorder with anxiety  Goal:	maternal mental well being

## 2019-01-18 NOTE — DISCHARGE NOTE ANTEPARTUM - HOSPITAL COURSE
34 yo  at 30w2d admitted with short cervix in the setting of di/di twin pregnancy. Patient with admission for 1 month for cervical monitoring. Patient with intermittent contractions, but no cervical change during admission. s/p  steroids and started on vaginal progesterone. Patient diagnosed with gdMA2 and started on metformin with adequately controlled fingersticks. Patient discharged in stable condition with stable maternal and fetal status.

## 2019-01-18 NOTE — DISCHARGE NOTE ANTEPARTUM - PATIENT PORTAL LINK FT
You can access the [facility] Patient Portal, offered by [facility], by registering with the following website: [email or web address]

## 2019-01-18 NOTE — DISCHARGE NOTE ANTEPARTUM - CARE PROVIDER_API CALL
Glenda Wiley), Obstetrics and Gynecology  [address], [place] [facility]  Phone: [phone]  Fax: [phone]

## 2019-01-18 NOTE — DISCHARGE NOTE ANTEPARTUM - MEDICATION SUMMARY - MEDICATIONS TO TAKE
I will START or STAY ON the medications listed below when I get home from the hospital:    metFORMIN 500 mg oral tablet, extended release  -- 1 tab(s) by mouth once a day (at bedtime)  -- Indication: For diabetes     Prenatal Multivitamins with Folic Acid 1 mg oral tablet  -- 1 tab(s) by mouth once a day  -- Indication: For pregnancy     docusate sodium 100 mg oral capsule  -- 1 cap(s) by mouth 2 times a day  -- Indication: For constipation     progesterone 100 mg vaginal insert  -- 200  vaginal  -- Indication: For short cervix

## 2019-01-18 NOTE — DISCHARGE NOTE ANTEPARTUM - PLAN OF CARE
term delivery - continue vaginal progesterone   - continue kick counts for fetal status  - f/u with Dr. Wiley in the office in 1 week maternal mental well being

## 2019-01-21 ENCOUNTER — INPATIENT (INPATIENT)
Facility: HOSPITAL | Age: 34
LOS: 2 days | Discharge: HOME | End: 2019-01-24
Attending: OBSTETRICS & GYNECOLOGY | Admitting: OBSTETRICS & GYNECOLOGY
Payer: COMMERCIAL

## 2019-01-21 LAB
ALBUMIN SERPL ELPH-MCNC: 3.4 G/DL — SIGNIFICANT CHANGE UP (ref 3.3–5)
ALP SERPL-CCNC: 113 U/L — SIGNIFICANT CHANGE UP (ref 40–120)
ALT FLD-CCNC: 14 U/L — SIGNIFICANT CHANGE UP (ref 10–45)
ANION GAP SERPL CALC-SCNC: 12 MMOL/L — SIGNIFICANT CHANGE UP (ref 5–17)
APTT BLD: 24.5 SEC — LOW (ref 27.5–36.3)
AST SERPL-CCNC: 13 U/L — SIGNIFICANT CHANGE UP (ref 10–40)
BASOPHILS NFR BLD AUTO: 0.5 % — SIGNIFICANT CHANGE UP (ref 0–2)
BILIRUB SERPL-MCNC: 0.3 MG/DL — SIGNIFICANT CHANGE UP (ref 0.2–1.2)
BLD GP AB SCN SERPL QL: NEGATIVE — SIGNIFICANT CHANGE UP
BUN SERPL-MCNC: 10 MG/DL — SIGNIFICANT CHANGE UP (ref 7–23)
CALCIUM SERPL-MCNC: 9.5 MG/DL — SIGNIFICANT CHANGE UP (ref 8.4–10.5)
CHLORIDE SERPL-SCNC: 105 MMOL/L — SIGNIFICANT CHANGE UP (ref 96–108)
CO2 SERPL-SCNC: 20 MMOL/L — LOW (ref 22–31)
CREAT SERPL-MCNC: 0.56 MG/DL — SIGNIFICANT CHANGE UP (ref 0.5–1.3)
EOSINOPHIL NFR BLD AUTO: 0.4 % — SIGNIFICANT CHANGE UP (ref 0–6)
FIBRINOGEN PPP-MCNC: 479 MG/DL — HIGH (ref 258–438)
GLUCOSE SERPL-MCNC: 82 MG/DL — SIGNIFICANT CHANGE UP (ref 70–99)
HCT VFR BLD CALC: 37.9 % — SIGNIFICANT CHANGE UP (ref 34.5–45)
HGB BLD-MCNC: 13.2 G/DL — SIGNIFICANT CHANGE UP (ref 11.5–15.5)
INR BLD: 0.95 — SIGNIFICANT CHANGE UP (ref 0.88–1.16)
LDH SERPL L TO P-CCNC: 154 U/L — SIGNIFICANT CHANGE UP (ref 50–242)
LYMPHOCYTES # BLD AUTO: 15.1 % — SIGNIFICANT CHANGE UP (ref 13–44)
MCHC RBC-ENTMCNC: 31.3 PG — SIGNIFICANT CHANGE UP (ref 27–34)
MCHC RBC-ENTMCNC: 34.8 G/DL — SIGNIFICANT CHANGE UP (ref 32–36)
MCV RBC AUTO: 89.8 FL — SIGNIFICANT CHANGE UP (ref 80–100)
MONOCYTES NFR BLD AUTO: 9 % — SIGNIFICANT CHANGE UP (ref 2–14)
NEUTROPHILS NFR BLD AUTO: 75 % — SIGNIFICANT CHANGE UP (ref 43–77)
PLATELET # BLD AUTO: 237 K/UL — SIGNIFICANT CHANGE UP (ref 150–400)
POTASSIUM SERPL-MCNC: 3.7 MMOL/L — SIGNIFICANT CHANGE UP (ref 3.5–5.3)
POTASSIUM SERPL-SCNC: 3.7 MMOL/L — SIGNIFICANT CHANGE UP (ref 3.5–5.3)
PROT SERPL-MCNC: 6.4 G/DL — SIGNIFICANT CHANGE UP (ref 6–8.3)
PROTHROM AB SERPL-ACNC: 10.7 SEC — SIGNIFICANT CHANGE UP (ref 10–12.9)
RBC # BLD: 4.22 M/UL — SIGNIFICANT CHANGE UP (ref 3.8–5.2)
RBC # FLD: 14 % — SIGNIFICANT CHANGE UP (ref 10.3–16.9)
RH IG SCN BLD-IMP: POSITIVE — SIGNIFICANT CHANGE UP
SODIUM SERPL-SCNC: 137 MMOL/L — SIGNIFICANT CHANGE UP (ref 135–145)
URATE SERPL-MCNC: 3.7 MG/DL — SIGNIFICANT CHANGE UP (ref 2.5–7)
WBC # BLD: 10.6 K/UL — HIGH (ref 3.8–10.5)
WBC # FLD AUTO: 10.6 K/UL — HIGH (ref 3.8–10.5)

## 2019-01-21 RX ORDER — AMPICILLIN TRIHYDRATE 250 MG
CAPSULE ORAL
Qty: 0 | Refills: 0 | Status: DISCONTINUED | OUTPATIENT
Start: 2019-01-21 | End: 2019-01-22

## 2019-01-21 RX ORDER — AMPICILLIN TRIHYDRATE 250 MG
2 CAPSULE ORAL ONCE
Qty: 0 | Refills: 0 | Status: COMPLETED | OUTPATIENT
Start: 2019-01-21 | End: 2019-01-21

## 2019-01-21 RX ORDER — MAGNESIUM SULFATE 500 MG/ML
2 VIAL (ML) INJECTION
Qty: 40 | Refills: 0 | Status: DISCONTINUED | OUTPATIENT
Start: 2019-01-21 | End: 2019-01-21

## 2019-01-21 RX ORDER — MAGNESIUM SULFATE 500 MG/ML
4 VIAL (ML) INJECTION ONCE
Qty: 0 | Refills: 0 | Status: DISCONTINUED | OUTPATIENT
Start: 2019-01-21 | End: 2019-01-21

## 2019-01-21 RX ORDER — SODIUM CHLORIDE 9 MG/ML
1000 INJECTION, SOLUTION INTRAVENOUS
Qty: 0 | Refills: 0 | Status: DISCONTINUED | OUTPATIENT
Start: 2019-01-21 | End: 2019-01-22

## 2019-01-21 RX ORDER — AZITHROMYCIN 500 MG/1
1000 TABLET, FILM COATED ORAL ONCE
Qty: 0 | Refills: 0 | Status: DISCONTINUED | OUTPATIENT
Start: 2019-01-21 | End: 2019-01-22

## 2019-01-21 RX ORDER — MAGNESIUM SULFATE 500 MG/ML
6 VIAL (ML) INJECTION ONCE
Qty: 0 | Refills: 0 | Status: DISCONTINUED | OUTPATIENT
Start: 2019-01-21 | End: 2019-01-21

## 2019-01-21 RX ORDER — FENTANYL/BUPIVACAINE/NS/PF 2MCG/ML-.1
250 PLASTIC BAG, INJECTION (ML) INJECTION
Qty: 0 | Refills: 0 | Status: DISCONTINUED | OUTPATIENT
Start: 2019-01-21 | End: 2019-01-22

## 2019-01-21 RX ORDER — MAGNESIUM SULFATE 500 MG/ML
4 VIAL (ML) INJECTION ONCE
Qty: 0 | Refills: 0 | Status: DISCONTINUED | OUTPATIENT
Start: 2019-01-21 | End: 2019-01-22

## 2019-01-21 RX ORDER — MAGNESIUM SULFATE 500 MG/ML
2 VIAL (ML) INJECTION
Qty: 40 | Refills: 0 | Status: DISCONTINUED | OUTPATIENT
Start: 2019-01-21 | End: 2019-01-22

## 2019-01-21 RX ORDER — MAGNESIUM SULFATE 500 MG/ML
2 VIAL (ML) INJECTION ONCE
Qty: 0 | Refills: 0 | Status: DISCONTINUED | OUTPATIENT
Start: 2019-01-21 | End: 2019-01-21

## 2019-01-21 RX ORDER — METFORMIN HYDROCHLORIDE 850 MG/1
500 TABLET ORAL DAILY
Qty: 0 | Refills: 0 | Status: DISCONTINUED | OUTPATIENT
Start: 2019-01-21 | End: 2019-01-21

## 2019-01-21 RX ORDER — MAGNESIUM SULFATE 500 MG/ML
2 VIAL (ML) INJECTION ONCE
Qty: 0 | Refills: 0 | Status: DISCONTINUED | OUTPATIENT
Start: 2019-01-21 | End: 2019-01-22

## 2019-01-21 RX ORDER — METFORMIN HYDROCHLORIDE 850 MG/1
500 TABLET ORAL DAILY
Qty: 0 | Refills: 0 | Status: DISCONTINUED | OUTPATIENT
Start: 2019-01-21 | End: 2019-01-22

## 2019-01-21 RX ORDER — AMPICILLIN TRIHYDRATE 250 MG
2 CAPSULE ORAL EVERY 6 HOURS
Qty: 0 | Refills: 0 | Status: DISCONTINUED | OUTPATIENT
Start: 2019-01-22 | End: 2019-01-22

## 2019-01-21 RX ADMIN — Medication 12 MILLIGRAM(S): at 21:57

## 2019-01-21 RX ADMIN — SODIUM CHLORIDE 125 MILLILITER(S): 9 INJECTION, SOLUTION INTRAVENOUS at 21:58

## 2019-01-21 RX ADMIN — Medication 216 GRAM(S): at 23:27

## 2019-01-22 VITALS
TEMPERATURE: 98 F | OXYGEN SATURATION: 100 % | DIASTOLIC BLOOD PRESSURE: 62 MMHG | RESPIRATION RATE: 18 BRPM | SYSTOLIC BLOOD PRESSURE: 109 MMHG | HEART RATE: 90 BPM

## 2019-01-22 LAB — T PALLIDUM AB TITR SER: NEGATIVE — SIGNIFICANT CHANGE UP

## 2019-01-22 RX ORDER — SIMETHICONE 80 MG/1
80 TABLET, CHEWABLE ORAL EVERY 6 HOURS
Qty: 0 | Refills: 0 | Status: DISCONTINUED | OUTPATIENT
Start: 2019-01-22 | End: 2019-01-24

## 2019-01-22 RX ORDER — TETANUS TOXOID, REDUCED DIPHTHERIA TOXOID AND ACELLULAR PERTUSSIS VACCINE, ADSORBED 5; 2.5; 8; 8; 2.5 [IU]/.5ML; [IU]/.5ML; UG/.5ML; UG/.5ML; UG/.5ML
0.5 SUSPENSION INTRAMUSCULAR ONCE
Qty: 0 | Refills: 0 | Status: COMPLETED | OUTPATIENT
Start: 2019-01-22 | End: 2019-12-21

## 2019-01-22 RX ORDER — ACETAMINOPHEN 500 MG
650 TABLET ORAL EVERY 6 HOURS
Qty: 0 | Refills: 0 | Status: DISCONTINUED | OUTPATIENT
Start: 2019-01-22 | End: 2019-01-24

## 2019-01-22 RX ORDER — PRAMOXINE HYDROCHLORIDE 150 MG/15G
1 AEROSOL, FOAM RECTAL EVERY 4 HOURS
Qty: 0 | Refills: 0 | Status: DISCONTINUED | OUTPATIENT
Start: 2019-01-22 | End: 2019-01-24

## 2019-01-22 RX ORDER — OXYTOCIN 10 UNIT/ML
41.67 VIAL (ML) INJECTION
Qty: 20 | Refills: 0 | Status: DISCONTINUED | OUTPATIENT
Start: 2019-01-22 | End: 2019-01-24

## 2019-01-22 RX ORDER — SODIUM CHLORIDE 9 MG/ML
3 INJECTION INTRAMUSCULAR; INTRAVENOUS; SUBCUTANEOUS EVERY 8 HOURS
Qty: 0 | Refills: 0 | Status: DISCONTINUED | OUTPATIENT
Start: 2019-01-22 | End: 2019-01-24

## 2019-01-22 RX ORDER — LANOLIN
1 OINTMENT (GRAM) TOPICAL EVERY 6 HOURS
Qty: 0 | Refills: 0 | Status: DISCONTINUED | OUTPATIENT
Start: 2019-01-22 | End: 2019-01-24

## 2019-01-22 RX ORDER — DOCUSATE SODIUM 100 MG
100 CAPSULE ORAL
Qty: 0 | Refills: 0 | Status: DISCONTINUED | OUTPATIENT
Start: 2019-01-22 | End: 2019-01-24

## 2019-01-22 RX ORDER — AER TRAVELER 0.5 G/1
1 SOLUTION RECTAL; TOPICAL EVERY 4 HOURS
Qty: 0 | Refills: 0 | Status: DISCONTINUED | OUTPATIENT
Start: 2019-01-22 | End: 2019-01-24

## 2019-01-22 RX ORDER — IBUPROFEN 200 MG
600 TABLET ORAL EVERY 6 HOURS
Qty: 0 | Refills: 0 | Status: DISCONTINUED | OUTPATIENT
Start: 2019-01-22 | End: 2019-01-24

## 2019-01-22 RX ORDER — DIPHENHYDRAMINE HCL 50 MG
25 CAPSULE ORAL EVERY 6 HOURS
Qty: 0 | Refills: 0 | Status: DISCONTINUED | OUTPATIENT
Start: 2019-01-22 | End: 2019-01-24

## 2019-01-22 RX ORDER — MAGNESIUM HYDROXIDE 400 MG/1
30 TABLET, CHEWABLE ORAL
Qty: 0 | Refills: 0 | Status: DISCONTINUED | OUTPATIENT
Start: 2019-01-22 | End: 2019-01-24

## 2019-01-22 RX ORDER — DIBUCAINE 1 %
1 OINTMENT (GRAM) RECTAL EVERY 4 HOURS
Qty: 0 | Refills: 0 | Status: DISCONTINUED | OUTPATIENT
Start: 2019-01-22 | End: 2019-01-24

## 2019-01-22 RX ADMIN — PRAMOXINE HYDROCHLORIDE 1 APPLICATION(S): 150 AEROSOL, FOAM RECTAL at 22:13

## 2019-01-22 RX ADMIN — Medication 1 APPLICATION(S): at 22:13

## 2019-01-22 RX ADMIN — SODIUM CHLORIDE 3 MILLILITER(S): 9 INJECTION INTRAMUSCULAR; INTRAVENOUS; SUBCUTANEOUS at 07:19

## 2019-01-22 RX ADMIN — Medication 600 MILLIGRAM(S): at 13:00

## 2019-01-22 RX ADMIN — Medication 125 MILLIUNIT(S)/MIN: at 02:19

## 2019-01-22 RX ADMIN — Medication 1 APPLICATION(S): at 16:29

## 2019-01-22 RX ADMIN — SODIUM CHLORIDE 3 MILLILITER(S): 9 INJECTION INTRAMUSCULAR; INTRAVENOUS; SUBCUTANEOUS at 22:25

## 2019-01-22 RX ADMIN — SODIUM CHLORIDE 3 MILLILITER(S): 9 INJECTION INTRAMUSCULAR; INTRAVENOUS; SUBCUTANEOUS at 14:00

## 2019-01-22 RX ADMIN — Medication 100 MILLIGRAM(S): at 09:49

## 2019-01-22 RX ADMIN — Medication 600 MILLIGRAM(S): at 12:11

## 2019-01-22 NOTE — LACTATION INITIAL EVALUATION - INFANT FEEDING PLAN COMMENT, OB PROFILE
Mom is pumping to provide EBM for her pre term twins. She has a breast pump to use at home. She has started pumping . Guidelines for pumping and milk storage provided.

## 2019-01-23 LAB
C TRACH RRNA SPEC QL NAA+PROBE: SIGNIFICANT CHANGE UP
CULTURE RESULTS: SIGNIFICANT CHANGE UP
N GONORRHOEA RRNA SPEC QL NAA+PROBE: SIGNIFICANT CHANGE UP
SPECIMEN SOURCE: SIGNIFICANT CHANGE UP
SPECIMEN SOURCE: SIGNIFICANT CHANGE UP

## 2019-01-23 RX ADMIN — Medication 100 MILLIGRAM(S): at 21:39

## 2019-01-23 RX ADMIN — Medication 600 MILLIGRAM(S): at 09:30

## 2019-01-23 RX ADMIN — Medication 600 MILLIGRAM(S): at 08:43

## 2019-01-23 RX ADMIN — Medication 1 APPLICATION(S): at 21:40

## 2019-01-23 RX ADMIN — SODIUM CHLORIDE 3 MILLILITER(S): 9 INJECTION INTRAMUSCULAR; INTRAVENOUS; SUBCUTANEOUS at 19:42

## 2019-01-23 NOTE — DISCHARGE NOTE OB - PATIENT PORTAL LINK FT
You can access the [facility] Portal, offered by [facility], by registering with the following website: [email or web address]

## 2019-01-23 NOTE — DISCHARGE NOTE OB - HOSPITAL COURSE
pATIENT PRESENTED WITH srom OF TWIN A clear fluid and progressed rapisly ot fully and delivered on  a live male 1650grams-- apgar 7/9  taken to NICU. Twin B cephalic with buklging Bag of water had epidural and pitocin augmentation of labor and had  of live male on 2019 weighing 1940grams apgar 6/9. taken to NICU. Post partum course uncomplicated d/c home on day 2

## 2019-01-23 NOTE — PROGRESS NOTE ADULT - SUBJECTIVE AND OBJECTIVE BOX
Patient evaluated at bedside.   She reports pain is well controlled.    She has been ambulating without assistance, voiding spontaneously, and is breastfeeding.    She denies HA, dizziness, chest pain, palpitations, shortness of breathe, n/v, heavy vaginal bleeding or perineal discomfort.    Physical Exam:  Vital Signs Last 24 Hrs  T(C): 36.1 (22 Jan 2019 22:00), Max: 36.7 (22 Jan 2019 17:07)  T(F): 97 (22 Jan 2019 22:00), Max: 98 (22 Jan 2019 17:07)  HR: 87 (22 Jan 2019 22:00) (87 - 100)  BP: 103/65 (22 Jan 2019 22:00) (103/65 - 105/65)  BP(mean): --  RR: 18 (22 Jan 2019 22:00) (18 - 18)  SpO2: 97% (22 Jan 2019 22:00) (96% - 98%)    GA: NAD, A+0 x 3  Abd: + BS, soft, nontender, nondistended, no rebound or guarding, uterus firm at midline  : lochia WNL  Extremities: no swelling or calf tenderness                          13.2   10.6  )-----------( 237      ( 21 Jan 2019 22:05 )             37.9     01-21    137  |  105  |  10  ----------------------------<  82  3.7   |  20<L>  |  0.56    Ca    9.5      21 Jan 2019 22:05    TPro  6.4  /  Alb  3.4  /  TBili  0.3  /  DBili  x   /  AST  13  /  ALT  14  /  AlkPhos  113  01-21    PT/INR - ( 21 Jan 2019 22:05 )   PT: 10.7 sec;   INR: 0.95          PTT - ( 21 Jan 2019 22:05 )  PTT:24.5 sec    MEDICATIONS  (STANDING):  diphtheria/tetanus/pertussis (acellular) Vaccine (ADAcel) 0.5 milliLiter(s) IntraMuscular once  oxytocin Infusion 41.667 milliUNIT(s)/Min (125 mL/Hr) IV Continuous <Continuous>  prenatal multivitamin 1 Tablet(s) Oral daily  sodium chloride 0.9% lock flush 3 milliLiter(s) IV Push every 8 hours  triamcinolone 0.025% Cream 1 Application(s) Topical three times a day    MEDICATIONS  (PRN):  acetaminophen   Tablet .. 650 milliGRAM(s) Oral every 6 hours PRN Temp greater or equal to 38.5C (101.3F), Mild Pain (1 - 3)  dibucaine 1% Ointment 1 Application(s) Topical every 4 hours PRN Perineal Discomfort  diphenhydrAMINE 25 milliGRAM(s) Oral every 6 hours PRN Itching  docusate sodium 100 milliGRAM(s) Oral two times a day PRN Stool Softening  ibuprofen  Tablet. 600 milliGRAM(s) Oral every 6 hours PRN Moderate Pain (4 - 6)  lanolin Ointment 1 Application(s) Topical every 6 hours PRN Sore Nipples  magnesium hydroxide Suspension 30 milliLiter(s) Oral two times a day PRN Constipation  pramoxine 1%/zinc 5% Cream 1 Application(s) Topical every 4 hours PRN Moderate to Severe Perineal Pain  simethicone 80 milliGRAM(s) Chew every 6 hours PRN Gas  witch hazel Pads 1 Application(s) Topical every 4 hours PRN Perineal Discomfort

## 2019-01-23 NOTE — DISCHARGE NOTE OB - CARE PROVIDER_API CALL
Glenda Wiley), Obstetrics and Gynecology  [address], [place] [facility]  Phone: [phone]  Fax: [phone]

## 2019-01-23 NOTE — PROGRESS NOTE ADULT - ASSESSMENT
A/P yo 33y  s/p PTD of di di twins after PPROM at 30w5d, PP#1, stable (baby A delivered 1/21, baby B delivered 1/22)  1. Pain: OPM  2. GI: Reg  3. :  Voiding  4. DVT prophylaxis: SCDs, ambulation  5. Dispo: PP#2

## 2019-01-23 NOTE — DISCHARGE NOTE OB - CARE PLAN
Principal Discharge DX:	Short cervix, antepartum, second trimester  Goal:	CONTINUE BREASTFEEDING  Assessment and plan of treatment:	AMBULATE  Secondary Diagnosis:	 infant, 1,750-1,999 grams  Secondary Diagnosis:	Gestational diabetes

## 2019-01-24 VITALS
SYSTOLIC BLOOD PRESSURE: 106 MMHG | TEMPERATURE: 98 F | RESPIRATION RATE: 18 BRPM | OXYGEN SATURATION: 98 % | HEART RATE: 70 BPM | DIASTOLIC BLOOD PRESSURE: 68 MMHG

## 2019-01-24 PROCEDURE — 86901 BLOOD TYPING SEROLOGIC RH(D): CPT

## 2019-01-24 PROCEDURE — 87591 N.GONORRHOEAE DNA AMP PROB: CPT

## 2019-01-24 PROCEDURE — 87086 URINE CULTURE/COLONY COUNT: CPT

## 2019-01-24 PROCEDURE — 86780 TREPONEMA PALLIDUM: CPT

## 2019-01-24 PROCEDURE — 36415 COLL VENOUS BLD VENIPUNCTURE: CPT

## 2019-01-24 PROCEDURE — 85610 PROTHROMBIN TIME: CPT

## 2019-01-24 PROCEDURE — 86850 RBC ANTIBODY SCREEN: CPT

## 2019-01-24 PROCEDURE — 87563 M. GENITALIUM AMP PROBE: CPT

## 2019-01-24 PROCEDURE — 87798 DETECT AGENT NOS DNA AMP: CPT

## 2019-01-24 PROCEDURE — 87661 TRICHOMONAS VAGINALIS AMPLIF: CPT

## 2019-01-24 PROCEDURE — 87491 CHLMYD TRACH DNA AMP PROBE: CPT

## 2019-01-24 PROCEDURE — 84550 ASSAY OF BLOOD/URIC ACID: CPT

## 2019-01-24 PROCEDURE — 87801 DETECT AGNT MULT DNA AMPLI: CPT

## 2019-01-24 PROCEDURE — 86900 BLOOD TYPING SEROLOGIC ABO: CPT

## 2019-01-24 PROCEDURE — 85730 THROMBOPLASTIN TIME PARTIAL: CPT

## 2019-01-24 PROCEDURE — 80053 COMPREHEN METABOLIC PANEL: CPT

## 2019-01-24 PROCEDURE — 85384 FIBRINOGEN ACTIVITY: CPT

## 2019-01-24 PROCEDURE — 83615 LACTATE (LD) (LDH) ENZYME: CPT

## 2019-01-24 PROCEDURE — 90715 TDAP VACCINE 7 YRS/> IM: CPT

## 2019-01-24 PROCEDURE — 88307 TISSUE EXAM BY PATHOLOGIST: CPT

## 2019-01-24 PROCEDURE — 85025 COMPLETE CBC W/AUTO DIFF WBC: CPT

## 2019-01-24 RX ORDER — TETANUS TOXOID, REDUCED DIPHTHERIA TOXOID AND ACELLULAR PERTUSSIS VACCINE, ADSORBED 5; 2.5; 8; 8; 2.5 [IU]/.5ML; [IU]/.5ML; UG/.5ML; UG/.5ML; UG/.5ML
0.5 SUSPENSION INTRAMUSCULAR ONCE
Qty: 0 | Refills: 0 | Status: COMPLETED | OUTPATIENT
Start: 2019-01-24 | End: 2019-01-24

## 2019-01-24 RX ADMIN — TETANUS TOXOID, REDUCED DIPHTHERIA TOXOID AND ACELLULAR PERTUSSIS VACCINE, ADSORBED 0.5 MILLILITER(S): 5; 2.5; 8; 8; 2.5 SUSPENSION INTRAMUSCULAR at 19:53

## 2019-01-24 RX ADMIN — Medication 600 MILLIGRAM(S): at 14:18

## 2019-01-24 RX ADMIN — Medication 600 MILLIGRAM(S): at 15:14

## 2019-01-24 NOTE — PROGRESS NOTE ADULT - SUBJECTIVE AND OBJECTIVE BOX
Attending Note  Patient voices no complaint, breastfeeding well  VS- stable, afebrile  Breasts- Full, lactating. No erythema or nipple crack  ABd- soft,appropriately distended, fundus 2cm below umbilicus, firm, non-tender  Pelvic- No swelling or ecchymosis,scant lochia rubra  EXT- no edema, smith's negative  IMP: well PP  Plan: d/c home           f/u 4wks for pp care           continue prenatal vit

## 2019-01-24 NOTE — PROGRESS NOTE ADULT - ASSESSMENT
A/P yo 33y  s/p PTD of di di twins after PPROM at 30w5d, PP#2, stable (baby A delivered 1/21, baby B delivered 1/22)  1. Pain: OPM  2. GI: Reg  3. :  Voiding  4. DVT prophylaxis: SCDs, ambulation  5. Dispo: PP#2

## 2019-01-24 NOTE — PROGRESS NOTE ADULT - SUBJECTIVE AND OBJECTIVE BOX
Patient evaluated at bedside, sitting up in bed and pumping.  She reports pain is well controlled.    She has been ambulating without assistance, voiding spontaneously, and is breastfeeding.    She denies HA, dizziness, chest pain, palpitations, shortness of breathe, n/v, heavy vaginal bleeding or perineal discomfort.    Physical Exam:  Vital Signs Last 24 Hrs  T(C): 36.7 (24 Jan 2019 06:00), Max: 36.7 (24 Jan 2019 06:00)  T(F): 98 (24 Jan 2019 06:00), Max: 98 (24 Jan 2019 06:00)  HR: 67 (24 Jan 2019 06:00) (67 - 78)  BP: 101/63 (24 Jan 2019 06:00) (100/69 - 101/63)  BP(mean): --  RR: 17 (24 Jan 2019 06:00) (17 - 18)  SpO2: 97% (24 Jan 2019 06:00) (97% - 98%)    GA: NAD, A+0 x 3  Abd: + BS, soft, nontender, nondistended, no rebound or guarding, uterus firm at midline  : lochia WNL  Extremities: no swelling or calf tenderness            MEDICATIONS  (STANDING):  diphtheria/tetanus/pertussis (acellular) Vaccine (ADAcel) 0.5 milliLiter(s) IntraMuscular once  oxytocin Infusion 41.667 milliUNIT(s)/Min (125 mL/Hr) IV Continuous <Continuous>  prenatal multivitamin 1 Tablet(s) Oral daily  sodium chloride 0.9% lock flush 3 milliLiter(s) IV Push every 8 hours  triamcinolone 0.025% Cream 1 Application(s) Topical three times a day    MEDICATIONS  (PRN):  acetaminophen   Tablet .. 650 milliGRAM(s) Oral every 6 hours PRN Temp greater or equal to 38.5C (101.3F), Mild Pain (1 - 3)  dibucaine 1% Ointment 1 Application(s) Topical every 4 hours PRN Perineal Discomfort  diphenhydrAMINE 25 milliGRAM(s) Oral every 6 hours PRN Itching  docusate sodium 100 milliGRAM(s) Oral two times a day PRN Stool Softening  ibuprofen  Tablet. 600 milliGRAM(s) Oral every 6 hours PRN Moderate Pain (4 - 6)  lanolin Ointment 1 Application(s) Topical every 6 hours PRN Sore Nipples  magnesium hydroxide Suspension 30 milliLiter(s) Oral two times a day PRN Constipation  pramoxine 1%/zinc 5% Cream 1 Application(s) Topical every 4 hours PRN Moderate to Severe Perineal Pain  simethicone 80 milliGRAM(s) Chew every 6 hours PRN Gas  witch hazel Pads 1 Application(s) Topical every 4 hours PRN Perineal Discomfort

## 2019-01-25 LAB
A VAGINAE DNA VAG QL NAA+PROBE: SIGNIFICANT CHANGE UP
BVAB2 DNA VAG QL NAA+PROBE: SIGNIFICANT CHANGE UP
C ALBICANS DNA VAG QL NAA+PROBE: NEGATIVE — SIGNIFICANT CHANGE UP
C GLABRATA DNA VAG QL NAA+PROBE: NEGATIVE — SIGNIFICANT CHANGE UP
M GENITALIUM DNA SPEC QL NAA+PROBE: NEGATIVE — SIGNIFICANT CHANGE UP
M HOMINIS DNA SPEC QL NAA+PROBE: NEGATIVE — SIGNIFICANT CHANGE UP
MEGA1 DNA VAG QL NAA+PROBE: SIGNIFICANT CHANGE UP
SURGICAL PATHOLOGY STUDY: SIGNIFICANT CHANGE UP
T VAGINALIS RRNA SPEC QL NAA+PROBE: NEGATIVE — SIGNIFICANT CHANGE UP
UREAPLASMA DNA SPEC QL NAA+PROBE: NEGATIVE — SIGNIFICANT CHANGE UP
VAGINITIS PANEL COMMENT: SIGNIFICANT CHANGE UP

## 2019-01-26 DIAGNOSIS — O26.872 CERVICAL SHORTENING, SECOND TRIMESTER: ICD-10-CM

## 2019-01-26 DIAGNOSIS — O98.812 OTHER MATERNAL INFECTIOUS AND PARASITIC DISEASES COMPLICATING PREGNANCY, SECOND TRIMESTER: ICD-10-CM

## 2019-01-26 DIAGNOSIS — O30.042 TWIN PREGNANCY, DICHORIONIC/DIAMNIOTIC, SECOND TRIMESTER: ICD-10-CM

## 2019-01-26 DIAGNOSIS — O99.342 OTHER MENTAL DISORDERS COMPLICATING PREGNANCY, SECOND TRIMESTER: ICD-10-CM

## 2019-01-26 DIAGNOSIS — Z3A.24 24 WEEKS GESTATION OF PREGNANCY: ICD-10-CM

## 2019-01-26 DIAGNOSIS — B37.2 CANDIDIASIS OF SKIN AND NAIL: ICD-10-CM

## 2019-01-26 DIAGNOSIS — O60.02 PRETERM LABOR WITHOUT DELIVERY, SECOND TRIMESTER: ICD-10-CM

## 2019-01-26 DIAGNOSIS — O24.415 GESTATIONAL DIABETES MELLITUS IN PREGNANCY, CONTROLLED BY ORAL HYPOGLYCEMIC DRUGS: ICD-10-CM

## 2019-01-26 DIAGNOSIS — F43.22 ADJUSTMENT DISORDER WITH ANXIETY: ICD-10-CM

## 2019-01-30 DIAGNOSIS — Z34.83 ENCOUNTER FOR SUPERVISION OF OTHER NORMAL PREGNANCY, THIRD TRIMESTER: ICD-10-CM

## 2019-01-30 DIAGNOSIS — Z23 ENCOUNTER FOR IMMUNIZATION: ICD-10-CM

## 2019-01-30 DIAGNOSIS — Z3A.30 30 WEEKS GESTATION OF PREGNANCY: ICD-10-CM

## 2019-01-30 DIAGNOSIS — O26.873 CERVICAL SHORTENING, THIRD TRIMESTER: ICD-10-CM

## 2019-01-30 DIAGNOSIS — O30.043 TWIN PREGNANCY, DICHORIONIC/DIAMNIOTIC, THIRD TRIMESTER: ICD-10-CM

## 2019-02-23 PROBLEM — Z00.00 ENCOUNTER FOR PREVENTIVE HEALTH EXAMINATION: Status: ACTIVE | Noted: 2019-02-23

## 2020-11-08 ENCOUNTER — RESULT REVIEW (OUTPATIENT)
Age: 35
End: 2020-11-08

## 2025-07-03 NOTE — PROGRESS NOTE ADULT - SUBJECTIVE AND OBJECTIVE BOX
Called Pt to schedule annual physical phone restricted   Attending Note  Patient voices no complaint, breastfeeding well. Neonates in NICU  VS- stable, afebrile  Breasts- Full, lactating. No erythema or nipple crack  ABd- soft,appropriately distended, fundus 2cm below umbilicus, firm, non-tender  Pelvic- No swelling or ecchymosis,scant lochia rubra  EXT- no edema, smith's negative  IMP: well PP  Plan: d/c home in am           f/u 4wks for pp care           continue prenatal vit